# Patient Record
Sex: FEMALE | Race: ASIAN | NOT HISPANIC OR LATINO
[De-identification: names, ages, dates, MRNs, and addresses within clinical notes are randomized per-mention and may not be internally consistent; named-entity substitution may affect disease eponyms.]

---

## 2021-04-06 ENCOUNTER — TRANSCRIPTION ENCOUNTER (OUTPATIENT)
Age: 81
End: 2021-04-06

## 2021-07-09 ENCOUNTER — OUTPATIENT (OUTPATIENT)
Dept: OUTPATIENT SERVICES | Facility: HOSPITAL | Age: 81
LOS: 1 days | End: 2021-07-09

## 2021-07-09 DIAGNOSIS — R91.1 SOLITARY PULMONARY NODULE: ICD-10-CM

## 2021-07-13 PROBLEM — Z00.00 ENCOUNTER FOR PREVENTIVE HEALTH EXAMINATION: Status: ACTIVE | Noted: 2021-07-13

## 2021-07-21 ENCOUNTER — APPOINTMENT (OUTPATIENT)
Dept: THORACIC SURGERY | Facility: CLINIC | Age: 81
End: 2021-07-21
Payer: MEDICARE

## 2021-07-21 VITALS — TEMPERATURE: 97.1 F

## 2021-07-21 DIAGNOSIS — Z86.59 PERSONAL HISTORY OF OTHER MENTAL AND BEHAVIORAL DISORDERS: ICD-10-CM

## 2021-07-21 DIAGNOSIS — R91.1 SOLITARY PULMONARY NODULE: ICD-10-CM

## 2021-07-21 DIAGNOSIS — Z78.9 OTHER SPECIFIED HEALTH STATUS: ICD-10-CM

## 2021-07-21 DIAGNOSIS — Z80.9 FAMILY HISTORY OF MALIGNANT NEOPLASM, UNSPECIFIED: ICD-10-CM

## 2021-07-21 PROCEDURE — 99205 OFFICE O/P NEW HI 60 MIN: CPT

## 2021-07-22 PROBLEM — Z86.59 HISTORY OF DEPRESSION: Status: RESOLVED | Noted: 2021-07-21 | Resolved: 2021-07-22

## 2021-07-22 PROBLEM — Z78.9 NON-SMOKER: Status: ACTIVE | Noted: 2021-07-21

## 2021-07-22 PROBLEM — Z80.9 FAMILY HISTORY OF MALIGNANT NEOPLASM: Status: ACTIVE | Noted: 2021-07-21

## 2021-07-22 RX ORDER — MELOXICAM 15 MG/1
15 TABLET ORAL
Refills: 0 | Status: ACTIVE | COMMUNITY

## 2021-07-22 RX ORDER — DOCUSATE SODIUM 100 MG/1
100 CAPSULE, LIQUID FILLED ORAL
Refills: 0 | Status: ACTIVE | COMMUNITY

## 2021-07-22 RX ORDER — RANITIDINE 150 MG/1
150 TABLET ORAL
Refills: 0 | Status: ACTIVE | COMMUNITY

## 2021-07-22 RX ORDER — PREGABALIN 300 MG/1
CAPSULE ORAL
Refills: 0 | Status: ACTIVE | COMMUNITY

## 2021-07-22 RX ORDER — VALSARTAN 160 MG/1
160 TABLET, COATED ORAL
Refills: 0 | Status: ACTIVE | COMMUNITY

## 2021-07-22 RX ORDER — ERGOCALCIFEROL (VITAMIN D2) 1250 MCG
50000 CAPSULE ORAL
Refills: 0 | Status: ACTIVE | COMMUNITY

## 2021-07-22 RX ORDER — VORTIOXETINE 20 MG/1
20 TABLET, FILM COATED ORAL
Refills: 0 | Status: ACTIVE | COMMUNITY

## 2021-07-22 NOTE — PHYSICAL EXAM
[General Appearance - Alert] : alert [General Appearance - In No Acute Distress] : in no acute distress [General Appearance - Well-Appearing] : healthy appearing [Sclera] : the sclera and conjunctiva were normal [Extraocular Movements] : extraocular movements were intact [Outer Ear] : the ears and nose were normal in appearance [Neck Appearance] : the appearance of the neck was normal [Neck Cervical Mass (___cm)] : no neck mass was observed [Jugular Venous Distention Increased] : there was no jugular-venous distention [Exaggerated Use Of Accessory Muscles For Inspiration] : no accessory muscle use [Auscultation Breath Sounds / Voice Sounds] : lungs were clear to auscultation bilaterally [Heart Sounds] : normal S1 and S2 [Examination Of The Chest] : the chest was normal in appearance [Abdomen Soft] : soft [Abdomen Tenderness] : non-tender [Cervical Lymph Nodes Enlarged Posterior Bilaterally] : posterior cervical [Cervical Lymph Nodes Enlarged Anterior Bilaterally] : anterior cervical [Supraclavicular Lymph Nodes Enlarged Bilaterally] : supraclavicular [No CVA Tenderness] : no ~M costovertebral angle tenderness [Abnormal Walk] : normal gait [Skin Color & Pigmentation] : normal skin color and pigmentation [] : no rash [No Focal Deficits] : no focal deficits [Oriented To Time, Place, And Person] : oriented to person, place, and time

## 2021-07-22 NOTE — ASSESSMENT
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 Alexander for evaluation of right lung nodule.\par \par Scans done at [facility]\par 11/19/2020 CT chest non-contrast\par Interval development of groups of small nodular densities in the right lower lobe measuring u p to 4 mm in diameter.  There are nonspecific in appearance and may be inflammatory in nature.\par \par 5/5/21 CT chest with contrast\par New subpleural lobular opacity in anterior lateral right lower lobe, cannot rule out neoplasm.  Further PET?CT assessment and or close CT monitoring as clinically indicated.\par \par 6/17/21 PET/CT\par FDG avid nodule  at the anterior right lung base.  Findings may be inflammatory versus malignant in nature.  Subcentimeter nodule along the minor fissure which appears stable and is not FDG avid.\par \par  has reviewed the case with IR at both [facility] and [facility], and due to location IR is unable to biopsy.   A CT surgery eval is recommended for tissue diagnosis.  \par \par Patient presents with her daughter in law.  We communicated using [employer] ID#[id number].\par Patient denies SOB, dyspnea, hemoptysis and hoarseness. She is asymptomatic and feeling well. \par \par I recommend Right VATS wedge biopsy with possible lobectomy.  \par She will need cardiac clearance and PFT's.  \par I referred her to Dr. Trotter (want a new cardiologist because she says her cardiologist location is too far)\par Tasked Dr. Staples to arrange PFT's\par \par Written by  Sowmya Whalen PA-C acting as a scribe for Aquiles Javier MD. The documentation recorded by the scribe accurately reflects the service I personally performed and the decisions made by me, AQUILES JAVIER MD.\par \par

## 2021-07-22 NOTE — HISTORY OF PRESENT ILLNESS
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 Alexander for evaluation of right lung nodule.\par \par Scans done at [facility]\par 11/19/2020 CT chest non-contrast\par Interval development of groups of small nodular densities in the right lower lobe measuring u p to 4 mm in diameter.  There are nonspecific in appearance and may be inflammatory in nature.\par \par 5/5/21 CT chest with contrast\par New subpleural lobular opacity in anterior lateral right lower lobe, cannot rule out neoplasm.  Further PET?CT assessment and or close CT monitoring as clinically indicated.\par \par 6/17/21 PET/CT\par FDG avid nodule  at the anterior right lung base.  Findings may be inflammatory versus malignant in nature.  Subcentimeter nodule along the minor fissure which appears stable and is not FDG avid.\par \par  has reviewed the case with IR at both [facility] and [facility], and due to location IR is unable to biopsy.   A CT surery eval is recommended for tissue diagnosis.  \par \par Patient presents with her daughter in law.  We communicated using [employer] ID#[id number].\par Patient denies SOB, dyspnea, hemoptysis and hoarseness. She is asymptomatic and feeling well. \par

## 2021-07-27 ENCOUNTER — APPOINTMENT (OUTPATIENT)
Dept: PULMONOLOGY | Facility: CLINIC | Age: 81
End: 2021-07-27
Payer: MEDICARE

## 2021-07-27 PROCEDURE — 94010 BREATHING CAPACITY TEST: CPT

## 2021-07-27 PROCEDURE — 94726 PLETHYSMOGRAPHY LUNG VOLUMES: CPT

## 2021-07-27 PROCEDURE — 94729 DIFFUSING CAPACITY: CPT

## 2021-08-03 DIAGNOSIS — I10 ESSENTIAL (PRIMARY) HYPERTENSION: ICD-10-CM

## 2021-08-03 DIAGNOSIS — E78.00 PURE HYPERCHOLESTEROLEMIA, UNSPECIFIED: ICD-10-CM

## 2021-08-03 DIAGNOSIS — E11.9 TYPE 2 DIABETES MELLITUS W/OUT COMPLICATIONS: ICD-10-CM

## 2021-08-04 ENCOUNTER — NON-APPOINTMENT (OUTPATIENT)
Age: 81
End: 2021-08-04

## 2021-08-04 ENCOUNTER — APPOINTMENT (OUTPATIENT)
Dept: CARDIOLOGY | Facility: CLINIC | Age: 81
End: 2021-08-04
Payer: MEDICARE

## 2021-08-04 VITALS
HEART RATE: 66 BPM | BODY MASS INDEX: 30.64 KG/M2 | SYSTOLIC BLOOD PRESSURE: 148 MMHG | DIASTOLIC BLOOD PRESSURE: 74 MMHG | WEIGHT: 146 LBS | OXYGEN SATURATION: 93 % | HEIGHT: 58 IN

## 2021-08-04 DIAGNOSIS — E03.9 HYPOTHYROIDISM, UNSPECIFIED: ICD-10-CM

## 2021-08-04 PROCEDURE — 99204 OFFICE O/P NEW MOD 45 MIN: CPT

## 2021-08-04 PROCEDURE — 93000 ELECTROCARDIOGRAM COMPLETE: CPT

## 2021-08-04 RX ORDER — ESCITALOPRAM OXALATE 20 MG/1
20 TABLET ORAL DAILY
Refills: 0 | Status: ACTIVE | COMMUNITY

## 2021-08-04 RX ORDER — FOLIC ACID 1 MG/1
1 TABLET ORAL
Qty: 90 | Refills: 3 | Status: ACTIVE | COMMUNITY

## 2021-08-04 RX ORDER — VALSARTAN 320 MG/1
320 TABLET, COATED ORAL DAILY
Qty: 90 | Refills: 1 | Status: ACTIVE | COMMUNITY

## 2021-08-04 RX ORDER — CHLORHEXIDINE GLUCONATE 4 %
1000 LIQUID (ML) TOPICAL DAILY
Refills: 0 | Status: ACTIVE | COMMUNITY

## 2021-08-04 RX ORDER — ALENDRONATE SODIUM 70 MG/1
70 TABLET ORAL
Refills: 0 | Status: ACTIVE | COMMUNITY

## 2021-08-04 RX ORDER — BUSPIRONE HYDROCHLORIDE 5 MG/1
5 TABLET ORAL
Refills: 0 | Status: ACTIVE | COMMUNITY

## 2021-08-04 RX ORDER — PANTOPRAZOLE 40 MG/1
TABLET, DELAYED RELEASE ORAL
Refills: 0 | Status: DISCONTINUED | COMMUNITY
End: 2021-08-04

## 2021-08-04 RX ORDER — PANTOPRAZOLE 40 MG/1
40 TABLET, DELAYED RELEASE ORAL DAILY
Qty: 90 | Refills: 3 | Status: ACTIVE | COMMUNITY

## 2021-08-04 RX ORDER — LEVOTHYROXINE SODIUM 125 UG/1
125 TABLET ORAL DAILY
Refills: 0 | Status: ACTIVE | COMMUNITY

## 2021-08-04 RX ORDER — METFORMIN HYDROCHLORIDE 625 MG/1
TABLET ORAL TWICE DAILY
Refills: 0 | Status: ACTIVE | COMMUNITY

## 2021-08-04 RX ORDER — MIRABEGRON 25 MG/1
25 TABLET, FILM COATED, EXTENDED RELEASE ORAL DAILY
Refills: 0 | Status: ACTIVE | COMMUNITY

## 2021-08-04 RX ORDER — MULTIVITAMIN
TABLET ORAL DAILY
Refills: 0 | Status: ACTIVE | COMMUNITY

## 2021-08-04 RX ORDER — IRON/IRON ASP GLY/FA/MV-MIN 38 125-25-1MG
TABLET ORAL DAILY
Refills: 0 | Status: ACTIVE | COMMUNITY

## 2021-08-04 NOTE — PHYSICAL EXAM
[Normal Conjunctiva] : normal conjunctiva [Normal Venous Pressure] : normal venous pressure [Normal S1, S2] : normal S1, S2 [Clear Lung Fields] : clear lung fields [Soft] : abdomen soft [de-identified] : Obese

## 2021-08-04 NOTE — HISTORY OF PRESENT ILLNESS
[FreeTextEntry1] : 81 yr F with HTN, Obesity\par Chronic dyspnea on exertiona, TTE in Feb 2021 was normal. \par Nuclear stress test in Feb 2021 no ischemia, EF 70%\par Patient walks with a walker, has very limited exercise tolerance because of her overall deconditioning, she is undergoing lung biopsy and is here for preop clearance.

## 2021-08-04 NOTE — DISCUSSION/SUMMARY
[FreeTextEntry1] : Hypertension: We will reassess the blood pressure on the next visit, at this time recommended medication compliance and salt restriction.\par \par Dyspnea on exertion: This is chronic, likely related to obesity and overall deconditioning.  She had stress test echocardiogram in the past that did not show any significant abnormalities.\par \par Preop prior to lung biopsy.  At this time patient does not need any further cardiovascular testing and is optimized for the lung procedure, recommend periprocedural monitoring as per standard surgical guidelines.

## 2021-08-04 NOTE — REVIEW OF SYSTEMS
[Fever] : no fever [Feeling Fatigued] : feeling fatigued [Blurry Vision] : no blurred vision [Earache] : no earache [Sore Throat] : no sore throat [SOB] : shortness of breath [Leg Claudication] : no intermittent leg claudication [Cough] : no cough [Abdominal Pain] : no abdominal pain

## 2021-08-29 ENCOUNTER — APPOINTMENT (OUTPATIENT)
Dept: DISASTER EMERGENCY | Facility: CLINIC | Age: 81
End: 2021-08-29

## 2021-08-30 LAB — SARS-COV-2 N GENE NPH QL NAA+PROBE: NOT DETECTED

## 2021-09-01 ENCOUNTER — APPOINTMENT (OUTPATIENT)
Dept: NUCLEAR MEDICINE | Facility: HOSPITAL | Age: 81
End: 2021-09-01
Payer: MEDICARE

## 2021-09-01 ENCOUNTER — OUTPATIENT (OUTPATIENT)
Dept: OUTPATIENT SERVICES | Facility: HOSPITAL | Age: 81
LOS: 1 days | End: 2021-09-01
Payer: COMMERCIAL

## 2021-09-01 DIAGNOSIS — R91.1 SOLITARY PULMONARY NODULE: ICD-10-CM

## 2021-09-01 PROCEDURE — 78597 LUNG PERFUSION DIFFERENTIAL: CPT

## 2021-09-01 PROCEDURE — 78597 LUNG PERFUSION DIFFERENTIAL: CPT | Mod: 26

## 2021-09-10 ENCOUNTER — OUTPATIENT (OUTPATIENT)
Dept: OUTPATIENT SERVICES | Facility: HOSPITAL | Age: 81
LOS: 1 days | End: 2021-09-10

## 2021-09-10 ENCOUNTER — NON-APPOINTMENT (OUTPATIENT)
Age: 81
End: 2021-09-10

## 2021-09-10 VITALS
TEMPERATURE: 98 F | HEART RATE: 77 BPM | WEIGHT: 169.98 LBS | OXYGEN SATURATION: 96 % | HEIGHT: 55 IN | DIASTOLIC BLOOD PRESSURE: 80 MMHG | SYSTOLIC BLOOD PRESSURE: 130 MMHG | RESPIRATION RATE: 14 BRPM

## 2021-09-10 DIAGNOSIS — Z90.49 ACQUIRED ABSENCE OF OTHER SPECIFIED PARTS OF DIGESTIVE TRACT: Chronic | ICD-10-CM

## 2021-09-10 DIAGNOSIS — E11.9 TYPE 2 DIABETES MELLITUS WITHOUT COMPLICATIONS: ICD-10-CM

## 2021-09-10 DIAGNOSIS — R91.1 SOLITARY PULMONARY NODULE: ICD-10-CM

## 2021-09-10 DIAGNOSIS — E78.5 HYPERLIPIDEMIA, UNSPECIFIED: ICD-10-CM

## 2021-09-10 DIAGNOSIS — Z01.818 ENCOUNTER FOR OTHER PREPROCEDURAL EXAMINATION: ICD-10-CM

## 2021-09-10 DIAGNOSIS — Z96.659 PRESENCE OF UNSPECIFIED ARTIFICIAL KNEE JOINT: Chronic | ICD-10-CM

## 2021-09-10 DIAGNOSIS — F32.9 MAJOR DEPRESSIVE DISORDER, SINGLE EPISODE, UNSPECIFIED: ICD-10-CM

## 2021-09-10 DIAGNOSIS — E03.9 HYPOTHYROIDISM, UNSPECIFIED: ICD-10-CM

## 2021-09-10 DIAGNOSIS — Z98.890 OTHER SPECIFIED POSTPROCEDURAL STATES: Chronic | ICD-10-CM

## 2021-09-10 DIAGNOSIS — I10 ESSENTIAL (PRIMARY) HYPERTENSION: ICD-10-CM

## 2021-09-10 LAB
A1C WITH ESTIMATED AVERAGE GLUCOSE RESULT: 6.3 % — HIGH (ref 4–5.6)
ALBUMIN SERPL ELPH-MCNC: 4.4 G/DL — SIGNIFICANT CHANGE UP (ref 3.3–5)
ALP SERPL-CCNC: 61 U/L — SIGNIFICANT CHANGE UP (ref 40–120)
ALT FLD-CCNC: 18 U/L — SIGNIFICANT CHANGE UP (ref 4–33)
ANION GAP SERPL CALC-SCNC: 13 MMOL/L — SIGNIFICANT CHANGE UP (ref 7–14)
AST SERPL-CCNC: 19 U/L — SIGNIFICANT CHANGE UP (ref 4–32)
BILIRUB SERPL-MCNC: 0.3 MG/DL — SIGNIFICANT CHANGE UP (ref 0.2–1.2)
BLD GP AB SCN SERPL QL: NEGATIVE — SIGNIFICANT CHANGE UP
BUN SERPL-MCNC: 15 MG/DL — SIGNIFICANT CHANGE UP (ref 7–23)
CALCIUM SERPL-MCNC: 8.8 MG/DL — SIGNIFICANT CHANGE UP (ref 8.4–10.5)
CHLORIDE SERPL-SCNC: 99 MMOL/L — SIGNIFICANT CHANGE UP (ref 98–107)
CO2 SERPL-SCNC: 29 MMOL/L — SIGNIFICANT CHANGE UP (ref 22–31)
CREAT SERPL-MCNC: 0.88 MG/DL — SIGNIFICANT CHANGE UP (ref 0.5–1.3)
ESTIMATED AVERAGE GLUCOSE: 134 — SIGNIFICANT CHANGE UP
GLUCOSE SERPL-MCNC: 102 MG/DL — HIGH (ref 70–99)
HCT VFR BLD CALC: 36.9 % — SIGNIFICANT CHANGE UP (ref 34.5–45)
HGB BLD-MCNC: 12.3 G/DL — SIGNIFICANT CHANGE UP (ref 11.5–15.5)
MCHC RBC-ENTMCNC: 29.3 PG — SIGNIFICANT CHANGE UP (ref 27–34)
MCHC RBC-ENTMCNC: 33.3 GM/DL — SIGNIFICANT CHANGE UP (ref 32–36)
MCV RBC AUTO: 87.9 FL — SIGNIFICANT CHANGE UP (ref 80–100)
NRBC # BLD: 0 /100 WBCS — SIGNIFICANT CHANGE UP
NRBC # FLD: 0 K/UL — SIGNIFICANT CHANGE UP
PLATELET # BLD AUTO: 134 K/UL — LOW (ref 150–400)
POTASSIUM SERPL-MCNC: 4 MMOL/L — SIGNIFICANT CHANGE UP (ref 3.5–5.3)
POTASSIUM SERPL-SCNC: 4 MMOL/L — SIGNIFICANT CHANGE UP (ref 3.5–5.3)
PROT SERPL-MCNC: 6.7 G/DL — SIGNIFICANT CHANGE UP (ref 6–8.3)
RBC # BLD: 4.2 M/UL — SIGNIFICANT CHANGE UP (ref 3.8–5.2)
RBC # FLD: 14.3 % — SIGNIFICANT CHANGE UP (ref 10.3–14.5)
RH IG SCN BLD-IMP: POSITIVE — SIGNIFICANT CHANGE UP
SODIUM SERPL-SCNC: 141 MMOL/L — SIGNIFICANT CHANGE UP (ref 135–145)
WBC # BLD: 8.37 K/UL — SIGNIFICANT CHANGE UP (ref 3.8–10.5)
WBC # FLD AUTO: 8.37 K/UL — SIGNIFICANT CHANGE UP (ref 3.8–10.5)

## 2021-09-10 RX ORDER — SODIUM CHLORIDE 9 MG/ML
1000 INJECTION INTRAMUSCULAR; INTRAVENOUS; SUBCUTANEOUS
Refills: 0 | Status: DISCONTINUED | OUTPATIENT
Start: 2021-09-14 | End: 2021-09-14

## 2021-09-10 NOTE — H&P PST ADULT - NSICDXPASTSURGICALHX_GEN_ALL_CORE_FT
PAST SURGICAL HISTORY:  H/O arthroscopy left shoulder    H/O total knee replacement b/l knee    History of appendectomy     S/P cholecystectomy

## 2021-09-10 NOTE — H&P PST ADULT - PROBLEM SELECTOR PLAN 4
Patient instructed to hold metformin the night before and on the morning of the surgery, pt verbalized understanding.

## 2021-09-10 NOTE — H&P PST ADULT - ATTENDING COMMENTS
plan for right robotic vats, right lower lobe wedge, son used a  (confirmed w/ translation line)   discussed procedure including risks not limited to bleeding, infection, scarring, injury to surrounding structures, prolonged air leak, blood clots .

## 2021-09-10 NOTE — H&P PST ADULT - PROBLEM SELECTOR PLAN 1
Pt scheduled for robotic assisted right VATS, right lower lobe wedge resection, mediastinal lymph node dissection on 09/14/21  Preop instructions provided. Pt verbalized understanding.   pt to take pantoprazole for GI Prophylaxis   written and verbal instructions with teach back on chlorhexidine shampoo provided,  pt verbalized understanding with returned demonstration   s/p cardiac eval, c/o dyspnea on exertion, copy in chart  pt confirmed has appt for COVID test scheduled 72 hrs preop

## 2021-09-10 NOTE — H&P PST ADULT - NSICDXPASTMEDICALHX_GEN_ALL_CORE_FT
PAST MEDICAL HISTORY:  Depression     DM (diabetes mellitus)     HLD (hyperlipidemia)     HTN (hypertension)     Hypothyroidism     Solitary pulmonary nodule

## 2021-09-10 NOTE — H&P PST ADULT - HISTORY OF PRESENT ILLNESS
81 y.o. female with h/o HTN, HLD, DM, hypothyroidism, presents to PST with preop diagnosis of solitary pulmonary nodule, c/o dyspnea on exertion, denies cough, hemoptysis, scheduled for robotic assisted right video assisted thoracoscopy, right lower lobe wedge resection, mediastinal lymph node dissection

## 2021-09-11 ENCOUNTER — APPOINTMENT (OUTPATIENT)
Dept: DISASTER EMERGENCY | Facility: CLINIC | Age: 81
End: 2021-09-11

## 2021-09-12 LAB — SARS-COV-2 N GENE NPH QL NAA+PROBE: NOT DETECTED

## 2021-09-13 RX ORDER — HEPARIN SODIUM 5000 [USP'U]/ML
5000 INJECTION INTRAVENOUS; SUBCUTANEOUS ONCE
Refills: 0 | Status: COMPLETED | OUTPATIENT
Start: 2021-09-14 | End: 2021-09-14

## 2021-09-13 NOTE — ASU PATIENT PROFILE, ADULT - LANGUAGE ASSISTANCE NEEDED
No-Patient/Caregiver offered and refused free interpretation services. Interpret [id number]/ patient requests son/No-Patient/Caregiver offered and refused free interpretation services.

## 2021-09-14 ENCOUNTER — RESULT REVIEW (OUTPATIENT)
Age: 81
End: 2021-09-14

## 2021-09-14 ENCOUNTER — INPATIENT (INPATIENT)
Facility: HOSPITAL | Age: 81
LOS: 5 days | Discharge: HOME HEALTH | End: 2021-09-20
Attending: STUDENT IN AN ORGANIZED HEALTH CARE EDUCATION/TRAINING PROGRAM | Admitting: STUDENT IN AN ORGANIZED HEALTH CARE EDUCATION/TRAINING PROGRAM
Payer: MEDICARE

## 2021-09-14 ENCOUNTER — APPOINTMENT (OUTPATIENT)
Dept: THORACIC SURGERY | Facility: HOSPITAL | Age: 81
End: 2021-09-14

## 2021-09-14 VITALS
HEART RATE: 73 BPM | WEIGHT: 169.98 LBS | OXYGEN SATURATION: 95 % | DIASTOLIC BLOOD PRESSURE: 72 MMHG | SYSTOLIC BLOOD PRESSURE: 148 MMHG | TEMPERATURE: 98 F | HEIGHT: 55 IN | RESPIRATION RATE: 14 BRPM

## 2021-09-14 DIAGNOSIS — R91.1 SOLITARY PULMONARY NODULE: ICD-10-CM

## 2021-09-14 DIAGNOSIS — Z98.890 OTHER SPECIFIED POSTPROCEDURAL STATES: Chronic | ICD-10-CM

## 2021-09-14 DIAGNOSIS — Z90.49 ACQUIRED ABSENCE OF OTHER SPECIFIED PARTS OF DIGESTIVE TRACT: Chronic | ICD-10-CM

## 2021-09-14 DIAGNOSIS — Z96.659 PRESENCE OF UNSPECIFIED ARTIFICIAL KNEE JOINT: Chronic | ICD-10-CM

## 2021-09-14 LAB — RH IG SCN BLD-IMP: POSITIVE — SIGNIFICANT CHANGE UP

## 2021-09-14 PROCEDURE — 71045 X-RAY EXAM CHEST 1 VIEW: CPT | Mod: 26

## 2021-09-14 PROCEDURE — 88307 TISSUE EXAM BY PATHOLOGIST: CPT | Mod: 26

## 2021-09-14 PROCEDURE — S2900 ROBOTIC SURGICAL SYSTEM: CPT | Mod: NC

## 2021-09-14 PROCEDURE — 32667 THORACOSCOPY W/W RESECT ADDL: CPT

## 2021-09-14 PROCEDURE — 88312 SPECIAL STAINS GROUP 1: CPT | Mod: 26

## 2021-09-14 PROCEDURE — 32666 THORACOSCOPY W/WEDGE RESECT: CPT

## 2021-09-14 PROCEDURE — 32667 THORACOSCOPY W/W RESECT ADDL: CPT | Mod: AS

## 2021-09-14 PROCEDURE — 32666 THORACOSCOPY W/WEDGE RESECT: CPT | Mod: AS

## 2021-09-14 PROCEDURE — 99233 SBSQ HOSP IP/OBS HIGH 50: CPT

## 2021-09-14 RX ORDER — MIRABEGRON 50 MG/1
1 TABLET, EXTENDED RELEASE ORAL
Qty: 0 | Refills: 0 | DISCHARGE

## 2021-09-14 RX ORDER — SODIUM CHLORIDE 9 MG/ML
1000 INJECTION, SOLUTION INTRAVENOUS
Refills: 0 | Status: DISCONTINUED | OUTPATIENT
Start: 2021-09-14 | End: 2021-09-14

## 2021-09-14 RX ORDER — PANTOPRAZOLE SODIUM 20 MG/1
40 TABLET, DELAYED RELEASE ORAL
Refills: 0 | Status: DISCONTINUED | OUTPATIENT
Start: 2021-09-14 | End: 2021-09-20

## 2021-09-14 RX ORDER — SENNA PLUS 8.6 MG/1
2 TABLET ORAL AT BEDTIME
Refills: 0 | Status: DISCONTINUED | OUTPATIENT
Start: 2021-09-14 | End: 2021-09-20

## 2021-09-14 RX ORDER — DEXTROSE 50 % IN WATER 50 %
12.5 SYRINGE (ML) INTRAVENOUS ONCE
Refills: 0 | Status: DISCONTINUED | OUTPATIENT
Start: 2021-09-14 | End: 2021-09-14

## 2021-09-14 RX ORDER — NALOXONE HYDROCHLORIDE 4 MG/.1ML
0.1 SPRAY NASAL
Refills: 0 | Status: DISCONTINUED | OUTPATIENT
Start: 2021-09-14 | End: 2021-09-20

## 2021-09-14 RX ORDER — ONDANSETRON 8 MG/1
4 TABLET, FILM COATED ORAL EVERY 6 HOURS
Refills: 0 | Status: DISCONTINUED | OUTPATIENT
Start: 2021-09-14 | End: 2021-09-20

## 2021-09-14 RX ORDER — LEVOTHYROXINE SODIUM 125 MCG
150 TABLET ORAL DAILY
Refills: 0 | Status: DISCONTINUED | OUTPATIENT
Start: 2021-09-14 | End: 2021-09-20

## 2021-09-14 RX ORDER — PREGABALIN 225 MG/1
1 CAPSULE ORAL
Qty: 0 | Refills: 0 | DISCHARGE

## 2021-09-14 RX ORDER — GLUCAGON INJECTION, SOLUTION 0.5 MG/.1ML
1 INJECTION, SOLUTION SUBCUTANEOUS ONCE
Refills: 0 | Status: DISCONTINUED | OUTPATIENT
Start: 2021-09-14 | End: 2021-09-14

## 2021-09-14 RX ORDER — ACETAMINOPHEN 500 MG
975 TABLET ORAL ONCE
Refills: 0 | Status: COMPLETED | OUTPATIENT
Start: 2021-09-14 | End: 2021-09-14

## 2021-09-14 RX ORDER — INSULIN LISPRO 100/ML
VIAL (ML) SUBCUTANEOUS
Refills: 0 | Status: DISCONTINUED | OUTPATIENT
Start: 2021-09-14 | End: 2021-09-20

## 2021-09-14 RX ORDER — DEXTROSE 50 % IN WATER 50 %
15 SYRINGE (ML) INTRAVENOUS ONCE
Refills: 0 | Status: DISCONTINUED | OUTPATIENT
Start: 2021-09-14 | End: 2021-09-14

## 2021-09-14 RX ORDER — ACETAMINOPHEN 500 MG
1000 TABLET ORAL ONCE
Refills: 0 | Status: DISCONTINUED | OUTPATIENT
Start: 2021-09-14 | End: 2021-09-15

## 2021-09-14 RX ORDER — DEXTROSE 50 % IN WATER 50 %
25 SYRINGE (ML) INTRAVENOUS ONCE
Refills: 0 | Status: DISCONTINUED | OUTPATIENT
Start: 2021-09-14 | End: 2021-09-14

## 2021-09-14 RX ORDER — RANITIDINE HYDROCHLORIDE 150 MG/1
1 TABLET, FILM COATED ORAL
Qty: 0 | Refills: 0 | DISCHARGE

## 2021-09-14 RX ORDER — METOPROLOL TARTRATE 50 MG
1 TABLET ORAL
Qty: 0 | Refills: 0 | DISCHARGE

## 2021-09-14 RX ORDER — MIRABEGRON 50 MG/1
25 TABLET, EXTENDED RELEASE ORAL DAILY
Refills: 0 | Status: DISCONTINUED | OUTPATIENT
Start: 2021-09-14 | End: 2021-09-16

## 2021-09-14 RX ORDER — HYDROMORPHONE HYDROCHLORIDE 2 MG/ML
30 INJECTION INTRAMUSCULAR; INTRAVENOUS; SUBCUTANEOUS
Refills: 0 | Status: DISCONTINUED | OUTPATIENT
Start: 2021-09-14 | End: 2021-09-15

## 2021-09-14 RX ORDER — HEPARIN SODIUM 5000 [USP'U]/ML
5000 INJECTION INTRAVENOUS; SUBCUTANEOUS EVERY 8 HOURS
Refills: 0 | Status: DISCONTINUED | OUTPATIENT
Start: 2021-09-14 | End: 2021-09-20

## 2021-09-14 RX ORDER — ATORVASTATIN CALCIUM 80 MG/1
1 TABLET, FILM COATED ORAL
Qty: 0 | Refills: 0 | DISCHARGE

## 2021-09-14 RX ORDER — METFORMIN HYDROCHLORIDE 850 MG/1
1 TABLET ORAL
Qty: 0 | Refills: 0 | DISCHARGE

## 2021-09-14 RX ORDER — HYDROMORPHONE HYDROCHLORIDE 2 MG/ML
0.5 INJECTION INTRAMUSCULAR; INTRAVENOUS; SUBCUTANEOUS
Refills: 0 | Status: DISCONTINUED | OUTPATIENT
Start: 2021-09-14 | End: 2021-09-15

## 2021-09-14 RX ORDER — DEXTROSE 50 % IN WATER 50 %
25 SYRINGE (ML) INTRAVENOUS ONCE
Refills: 0 | Status: DISCONTINUED | OUTPATIENT
Start: 2021-09-14 | End: 2021-09-20

## 2021-09-14 RX ORDER — PANTOPRAZOLE SODIUM 20 MG/1
1 TABLET, DELAYED RELEASE ORAL
Qty: 0 | Refills: 0 | DISCHARGE

## 2021-09-14 RX ORDER — ATORVASTATIN CALCIUM 80 MG/1
10 TABLET, FILM COATED ORAL AT BEDTIME
Refills: 0 | Status: DISCONTINUED | OUTPATIENT
Start: 2021-09-14 | End: 2021-09-20

## 2021-09-14 RX ORDER — LEVOTHYROXINE SODIUM 125 MCG
1 TABLET ORAL
Qty: 0 | Refills: 0 | DISCHARGE

## 2021-09-14 RX ORDER — SODIUM CHLORIDE 9 MG/ML
1000 INJECTION, SOLUTION INTRAVENOUS
Refills: 0 | Status: DISCONTINUED | OUTPATIENT
Start: 2021-09-14 | End: 2021-09-16

## 2021-09-14 RX ORDER — ACETAMINOPHEN 500 MG
1000 TABLET ORAL ONCE
Refills: 0 | Status: COMPLETED | OUTPATIENT
Start: 2021-09-14 | End: 2021-09-14

## 2021-09-14 RX ORDER — BENZOYL PEROXIDE MICRONIZED 5.8 %
1 TOWELETTE (EA) TOPICAL
Qty: 0 | Refills: 0 | DISCHARGE

## 2021-09-14 RX ORDER — MELOXICAM 15 MG/1
1 TABLET ORAL
Qty: 0 | Refills: 0 | DISCHARGE

## 2021-09-14 RX ADMIN — Medication 1000 MILLIGRAM(S): at 17:20

## 2021-09-14 RX ADMIN — HEPARIN SODIUM 5000 UNIT(S): 5000 INJECTION INTRAVENOUS; SUBCUTANEOUS at 17:02

## 2021-09-14 RX ADMIN — Medication 400 MILLIGRAM(S): at 17:05

## 2021-09-14 RX ADMIN — MIRABEGRON 25 MILLIGRAM(S): 50 TABLET, EXTENDED RELEASE ORAL at 17:02

## 2021-09-14 RX ADMIN — HYDROMORPHONE HYDROCHLORIDE 30 MILLILITER(S): 2 INJECTION INTRAMUSCULAR; INTRAVENOUS; SUBCUTANEOUS at 13:05

## 2021-09-14 RX ADMIN — SODIUM CHLORIDE 30 MILLILITER(S): 9 INJECTION, SOLUTION INTRAVENOUS at 12:50

## 2021-09-14 RX ADMIN — ATORVASTATIN CALCIUM 10 MILLIGRAM(S): 80 TABLET, FILM COATED ORAL at 23:14

## 2021-09-14 RX ADMIN — HEPARIN SODIUM 5000 UNIT(S): 5000 INJECTION INTRAVENOUS; SUBCUTANEOUS at 22:20

## 2021-09-14 RX ADMIN — SENNA PLUS 2 TABLET(S): 8.6 TABLET ORAL at 22:20

## 2021-09-14 NOTE — PROGRESS NOTE ADULT - SUBJECTIVE AND OBJECTIVE BOX
CHIEF COMPLAINT: FOLLOW UP IN ICU FOR POSTOPERATIVE CARE OF PATIENT WHO IS S/P multiple wedge resections      PROCEDURES:  Robotic RVATS, RUL wedgex2, RLL wedgex1, Pneumonolysis  14-Sep-2021       ISSUES:   Lung nodule  Post op pain  Chest tube in place  HTN  HLD  DM2  Hypothyroidism  Depression    INTERVAL EVENTS:   OR today. Extubated in OR. Transferred to CTICU.      HISTORY:   Patient reports moderate pain at chest wall incision sites which is worse with coughing and deep breathing without associated fever or dyspnea. Pain is improved with use of pain meds.     PHYSICAL EXAM:   Gen: Comfortable, No acute distress  Eyes: Sclera white, Conjunctiva normal, Eyelids normal, Pupils symmetrical   ENT: Mucous membranes moist,  ,  ,    Neck: Trachea midline,  ,  ,  ,  ,    CV: Rate regular, Rhythm regular,  ,  ,    Resp: Breath sounds clear, No accessory muscles use, R chest tube in place,  ,    Abd: Soft, Non-distended, Non-tender, Bowel sounds normal,  ,  ,    Skin: Warm, No peripheral edema of lower extremities,  ,    : No murphy  Neuro: Moving all 4 extremities,    Psych: A&Ox3      ASSESSMENT AND PLAN:     NEURO:  Post-operative Pain - Stable. Pain control with PCA and Tylenol IV PRN.        RESPIRATORY:  Hypoxia - Wean nasal cannula for goal O2sat above 92. Obtain CXR. Incentive spirometry. Chest PT and frequent suctioning. Continue bronchodilators. OOB to chair & ambulate w/ assistance. Continuous pulse oximetry for support & to prevent decompensation.     Depression - stable. continue psych meds.    Chest tube – Pleurevac regulated suctioning. Monitor chest tube output.    CARDIOVASCULAR:  Hemodynamically stable - Not on pressors. Continue hemodynamic monitoring.  Telemetry (medical test) - Reviewed by me today independently. Normal sinus rhythm.  HTN - stable. Hold home antihypertensives for now.         RENAL:  Stable - Monitor IOs and electrolytes. Keep K above 4.0 and Mg above 2.0.         GASTROINTESTINAL:  GI prophylaxis not indicated  Zofran and Reglan IV PRN for nausea  Regular consistency diet        GERD - stable. Continue famotidine    HEMATOLOGIC:  No signs of active bleeding. Monitor Hgb in CBC in AM  DVT prophylaxis with heparin subQ and SCDs.           INFECTIOUS DISEASE:  All surgical sites appear clean. Will monitor for fever and leukocytosis.       ENDOCRINE:  DM2 – Stable. Monitor glucose fingersticks for goal 120-180. Insulin sliding scale. Carb control diet.     Hypothyroidism - stable. continue synthroid.         ONCOLOGY:  Lung nodules - Improved. S/P resection. Follow up final pathology.      Pertinent clinical, laboratory, radiographic, hemodynamic, echocardiographic, respiratory data, microbiologic data and chart were reviewed by myself and analyzed frequently throughout the course of the day and night by myself.    Plan discussed at length with the CTICU staff and Attending CT Surgeon -   Dr Ambreen Magdaleno.      Patient's status was discussed with patient at bedside.           ________________________________________________    _________________________  VITAL SIGNS:  Vital Signs Last 24 Hrs  T(C): 36.7 (14 Sep 2021 20:00), Max: 36.9 (14 Sep 2021 06:17)  T(F): 98.1 (14 Sep 2021 20:00), Max: 98.4 (14 Sep 2021 06:17)  HR: 76 (14 Sep 2021 21:00) (73 - 83)  BP: 155/59 (14 Sep 2021 21:00) (133/56 - 167/65)  BP(mean): 75 (14 Sep 2021 21:00) (73 - 98)  RR: 18 (14 Sep 2021 21:00) (14 - 25)  SpO2: 97% (14 Sep 2021 21:00) (95% - 100%)  I/Os:   I&O's Detail    14 Sep 2021 07:01  -  14 Sep 2021 23:09  --------------------------------------------------------  IN:    Lactated Ringers: 330 mL  Total IN: 330 mL    OUT:    Chest Tube (mL): 100 mL    Voided (mL): 250 mL  Total OUT: 350 mL    Total NET: -20 mL              MEDICATIONS:  MEDICATIONS  (STANDING):  acetaminophen  IVPB .. 1000 milliGRAM(s) IV Intermittent once  atorvastatin 10 milliGRAM(s) Oral at bedtime  dextrose 50% Injectable 25 Gram(s) IV Push once  heparin   Injectable 5000 Unit(s) SubCutaneous every 8 hours  HYDROmorphone PCA (1 mG/mL) 30 milliLiter(s) PCA Continuous PCA Continuous  insulin lispro (ADMELOG) corrective regimen sliding scale   SubCutaneous three times a day before meals  lactated ringers. 1000 milliLiter(s) (30 mL/Hr) IV Continuous <Continuous>  levothyroxine 150 MICROGram(s) Oral daily  mirabegron ER 25 milliGRAM(s) Oral daily  pantoprazole    Tablet 40 milliGRAM(s) Oral before breakfast  pregabalin 50 milliGRAM(s) Oral daily  senna 2 Tablet(s) Oral at bedtime    MEDICATIONS  (PRN):  HYDROmorphone PCA (1 mG/mL) Rescue Clinician Bolus 0.5 milliGRAM(s) IV Push every 15 minutes PRN for Pain Scale GREATER THAN 6  naloxone Injectable 0.1 milliGRAM(s) IV Push every 3 minutes PRN For ANY of the following changes in patient status:  A. RR LESS THAN 10 breaths per minute, B. Oxygen saturation LESS THAN 90%, C. Sedation score of 6  ondansetron Injectable 4 milliGRAM(s) IV Push every 6 hours PRN Nausea      LABS:  Laboratory data was independently reviewed by me today.                       RADIOLOGY:   Radiology images were independently reviewed by me today. Reports were reviewed by me today.    Post operative CXR 9/14 reviewed by me. Chest tube in place. No significant pneumothorax

## 2021-09-14 NOTE — BRIEF OPERATIVE NOTE - NSICDXBRIEFPROCEDURE_GEN_ALL_CORE_FT
PROCEDURES:  Robotic assistance in thoracoscopic procedure 14-Sep-2021 11:53:22 FB, Robotic RVATS, RUL wedgex2, RLL wedgex1, Pneumonolysis Bree Baird

## 2021-09-14 NOTE — BRIEF OPERATIVE NOTE - COMMENTS
pt brought to pacu awaiting cti bed pt brought to pacu awaiting cti bed    I first assisted through out the entire case, including port placement, bedside assist while the surgeon at the console, and wound closure, JAG Weeks

## 2021-09-15 LAB
ANION GAP SERPL CALC-SCNC: 12 MMOL/L — SIGNIFICANT CHANGE UP (ref 7–14)
BASOPHILS # BLD AUTO: 0.02 K/UL — SIGNIFICANT CHANGE UP (ref 0–0.2)
BASOPHILS NFR BLD AUTO: 0.2 % — SIGNIFICANT CHANGE UP (ref 0–2)
BUN SERPL-MCNC: 15 MG/DL — SIGNIFICANT CHANGE UP (ref 7–23)
CALCIUM SERPL-MCNC: 8.6 MG/DL — SIGNIFICANT CHANGE UP (ref 8.4–10.5)
CHLORIDE SERPL-SCNC: 99 MMOL/L — SIGNIFICANT CHANGE UP (ref 98–107)
CO2 SERPL-SCNC: 29 MMOL/L — SIGNIFICANT CHANGE UP (ref 22–31)
CREAT SERPL-MCNC: 0.86 MG/DL — SIGNIFICANT CHANGE UP (ref 0.5–1.3)
EOSINOPHIL # BLD AUTO: 0 K/UL — SIGNIFICANT CHANGE UP (ref 0–0.5)
EOSINOPHIL NFR BLD AUTO: 0 % — SIGNIFICANT CHANGE UP (ref 0–6)
GLUCOSE SERPL-MCNC: 133 MG/DL — HIGH (ref 70–99)
HCT VFR BLD CALC: 35.3 % — SIGNIFICANT CHANGE UP (ref 34.5–45)
HGB BLD-MCNC: 11.2 G/DL — LOW (ref 11.5–15.5)
IANC: 7.14 K/UL — SIGNIFICANT CHANGE UP (ref 1.5–8.5)
IMM GRANULOCYTES NFR BLD AUTO: 0.5 % — SIGNIFICANT CHANGE UP (ref 0–1.5)
LYMPHOCYTES # BLD AUTO: 1.73 K/UL — SIGNIFICANT CHANGE UP (ref 1–3.3)
LYMPHOCYTES # BLD AUTO: 17.6 % — SIGNIFICANT CHANGE UP (ref 13–44)
MAGNESIUM SERPL-MCNC: 1.8 MG/DL — SIGNIFICANT CHANGE UP (ref 1.6–2.6)
MCHC RBC-ENTMCNC: 28.4 PG — SIGNIFICANT CHANGE UP (ref 27–34)
MCHC RBC-ENTMCNC: 31.7 GM/DL — LOW (ref 32–36)
MCV RBC AUTO: 89.6 FL — SIGNIFICANT CHANGE UP (ref 80–100)
MONOCYTES # BLD AUTO: 0.88 K/UL — SIGNIFICANT CHANGE UP (ref 0–0.9)
MONOCYTES NFR BLD AUTO: 9 % — SIGNIFICANT CHANGE UP (ref 2–14)
NEUTROPHILS # BLD AUTO: 7.14 K/UL — SIGNIFICANT CHANGE UP (ref 1.8–7.4)
NEUTROPHILS NFR BLD AUTO: 72.7 % — SIGNIFICANT CHANGE UP (ref 43–77)
NRBC # BLD: 0 /100 WBCS — SIGNIFICANT CHANGE UP
NRBC # FLD: 0 K/UL — SIGNIFICANT CHANGE UP
PLATELET # BLD AUTO: 128 K/UL — LOW (ref 150–400)
POTASSIUM SERPL-MCNC: 4.4 MMOL/L — SIGNIFICANT CHANGE UP (ref 3.5–5.3)
POTASSIUM SERPL-SCNC: 4.4 MMOL/L — SIGNIFICANT CHANGE UP (ref 3.5–5.3)
RBC # BLD: 3.94 M/UL — SIGNIFICANT CHANGE UP (ref 3.8–5.2)
RBC # FLD: 14.6 % — HIGH (ref 10.3–14.5)
SODIUM SERPL-SCNC: 140 MMOL/L — SIGNIFICANT CHANGE UP (ref 135–145)
WBC # BLD: 9.82 K/UL — SIGNIFICANT CHANGE UP (ref 3.8–10.5)
WBC # FLD AUTO: 9.82 K/UL — SIGNIFICANT CHANGE UP (ref 3.8–10.5)

## 2021-09-15 PROCEDURE — 99233 SBSQ HOSP IP/OBS HIGH 50: CPT

## 2021-09-15 PROCEDURE — 71045 X-RAY EXAM CHEST 1 VIEW: CPT | Mod: 26

## 2021-09-15 RX ORDER — SODIUM CHLORIDE 9 MG/ML
4 INJECTION INTRAMUSCULAR; INTRAVENOUS; SUBCUTANEOUS EVERY 6 HOURS
Refills: 0 | Status: DISCONTINUED | OUTPATIENT
Start: 2021-09-15 | End: 2021-09-16

## 2021-09-15 RX ORDER — MAGNESIUM SULFATE 500 MG/ML
2 VIAL (ML) INJECTION ONCE
Refills: 0 | Status: COMPLETED | OUTPATIENT
Start: 2021-09-15 | End: 2021-09-15

## 2021-09-15 RX ORDER — IPRATROPIUM/ALBUTEROL SULFATE 18-103MCG
3 AEROSOL WITH ADAPTER (GRAM) INHALATION ONCE
Refills: 0 | Status: DISCONTINUED | OUTPATIENT
Start: 2021-09-15 | End: 2021-09-16

## 2021-09-15 RX ORDER — METOPROLOL TARTRATE 50 MG
12.5 TABLET ORAL EVERY 12 HOURS
Refills: 0 | Status: DISCONTINUED | OUTPATIENT
Start: 2021-09-15 | End: 2021-09-18

## 2021-09-15 RX ORDER — ACETAMINOPHEN 500 MG
650 TABLET ORAL EVERY 6 HOURS
Refills: 0 | Status: COMPLETED | OUTPATIENT
Start: 2021-09-15 | End: 2021-09-17

## 2021-09-15 RX ORDER — OXYCODONE HYDROCHLORIDE 5 MG/1
5 TABLET ORAL
Refills: 0 | Status: DISCONTINUED | OUTPATIENT
Start: 2021-09-15 | End: 2021-09-17

## 2021-09-15 RX ORDER — LIDOCAINE 4 G/100G
1 CREAM TOPICAL EVERY 24 HOURS
Refills: 0 | Status: COMPLETED | OUTPATIENT
Start: 2021-09-15 | End: 2021-09-19

## 2021-09-15 RX ORDER — LIDOCAINE 4 G/100G
1 CREAM TOPICAL EVERY 24 HOURS
Refills: 0 | Status: DISCONTINUED | OUTPATIENT
Start: 2021-09-15 | End: 2021-09-15

## 2021-09-15 RX ADMIN — MIRABEGRON 25 MILLIGRAM(S): 50 TABLET, EXTENDED RELEASE ORAL at 11:20

## 2021-09-15 RX ADMIN — HEPARIN SODIUM 5000 UNIT(S): 5000 INJECTION INTRAVENOUS; SUBCUTANEOUS at 21:47

## 2021-09-15 RX ADMIN — LIDOCAINE 1 PATCH: 4 CREAM TOPICAL at 11:20

## 2021-09-15 RX ADMIN — ATORVASTATIN CALCIUM 10 MILLIGRAM(S): 80 TABLET, FILM COATED ORAL at 21:40

## 2021-09-15 RX ADMIN — Medication 650 MILLIGRAM(S): at 11:20

## 2021-09-15 RX ADMIN — Medication 650 MILLIGRAM(S): at 18:07

## 2021-09-15 RX ADMIN — LIDOCAINE 1 PATCH: 4 CREAM TOPICAL at 23:17

## 2021-09-15 RX ADMIN — LIDOCAINE 1 PATCH: 4 CREAM TOPICAL at 19:40

## 2021-09-15 RX ADMIN — SODIUM CHLORIDE 4 MILLILITER(S): 9 INJECTION INTRAMUSCULAR; INTRAVENOUS; SUBCUTANEOUS at 17:48

## 2021-09-15 RX ADMIN — HEPARIN SODIUM 5000 UNIT(S): 5000 INJECTION INTRAVENOUS; SUBCUTANEOUS at 14:45

## 2021-09-15 RX ADMIN — Medication 50 GRAM(S): at 09:41

## 2021-09-15 RX ADMIN — PANTOPRAZOLE SODIUM 40 MILLIGRAM(S): 20 TABLET, DELAYED RELEASE ORAL at 06:30

## 2021-09-15 RX ADMIN — OXYCODONE HYDROCHLORIDE 5 MILLIGRAM(S): 5 TABLET ORAL at 11:25

## 2021-09-15 RX ADMIN — HEPARIN SODIUM 5000 UNIT(S): 5000 INJECTION INTRAVENOUS; SUBCUTANEOUS at 06:31

## 2021-09-15 RX ADMIN — OXYCODONE HYDROCHLORIDE 5 MILLIGRAM(S): 5 TABLET ORAL at 12:55

## 2021-09-15 RX ADMIN — Medication 650 MILLIGRAM(S): at 17:29

## 2021-09-15 RX ADMIN — Medication 50 MILLIGRAM(S): at 11:22

## 2021-09-15 RX ADMIN — SODIUM CHLORIDE 4 MILLILITER(S): 9 INJECTION INTRAMUSCULAR; INTRAVENOUS; SUBCUTANEOUS at 22:38

## 2021-09-15 RX ADMIN — OXYCODONE HYDROCHLORIDE 5 MILLIGRAM(S): 5 TABLET ORAL at 22:10

## 2021-09-15 RX ADMIN — Medication 650 MILLIGRAM(S): at 12:54

## 2021-09-15 RX ADMIN — HYDROMORPHONE HYDROCHLORIDE 30 MILLILITER(S): 2 INJECTION INTRAMUSCULAR; INTRAVENOUS; SUBCUTANEOUS at 07:39

## 2021-09-15 RX ADMIN — OXYCODONE HYDROCHLORIDE 5 MILLIGRAM(S): 5 TABLET ORAL at 20:10

## 2021-09-15 RX ADMIN — Medication 150 MICROGRAM(S): at 06:31

## 2021-09-15 RX ADMIN — SENNA PLUS 2 TABLET(S): 8.6 TABLET ORAL at 21:40

## 2021-09-15 NOTE — PROGRESS NOTE ADULT - SUBJECTIVE AND OBJECTIVE BOX
Day _2__ of Anesthesia Pain Management Service    SUBJECTIVE:    Therapy:	  [x ] IV PCA	   [ ] Epidural           [ ] s/p Spinal Opoid              [ ] Postpartum infusion	  [ ] Patient controlled regional anesthesia (PCRA)    [ ] prn Analgesics    OBJECTIVE:   [ x] No new signs     [ ] Other:    Side Effects:  [x ] None			[ ] Other:    Assessment of Catheter Site:		[ ] Intact		[ ] Other:    ASSESSMENT/PLAN  [ ] Continue current therapy    [ x] Therapy changed to:    [ ] IV PCA       [ ] Epidural     [ x] prn Analgesics     Comments:

## 2021-09-15 NOTE — PHYSICAL THERAPY INITIAL EVALUATION ADULT - PRECAUTIONS/LIMITATIONS, REHAB EVAL
+2L of O2 through nasal cannula/cardiac precautions/fall precautions/oxygen therapy device and L/min/surgical precautions

## 2021-09-15 NOTE — PROGRESS NOTE ADULT - SUBJECTIVE AND OBJECTIVE BOX
ANAND BREEN      81y   Female   [id number]         No Known Allergies             Daily Height in cm: 175.26 (14 Sep 2021 13:57)    Daily Drug Dosing Weight  Height (cm): 175.3 (14 Sep 2021 13:57)  Weight (kg): 75.2 (14 Sep 2021 13:57)  BMI (kg/m2): 24.5 (14 Sep 2021 13:57)  BSA (m2): 1.91 (14 Sep 2021 13:57)    HPI:  81 y.o. female with h/o HTN, HLD, DM, hypothyroidism, presents to PST with preop diagnosis of solitary pulmonary nodule, c/o dyspnea on exertion, denies cough, hemoptysis, scheduled for robotic assisted right video assisted thoracoscopy, right lower lobe wedge resection, mediastinal lymph node dissection (10 Sep 2021 14:31).     Procedure:  Robotic RVATS, RUL wedgex2, RLL wedgex1, Pneumonolysis  14-Sep-2021                     Issues:              Lung nodule              Postop pain              Chest tube in place  HTN  HLD  DM2  Hypothyroidism  Depression              Home Medications:  atorvastatin 10 mg oral tablet: 1 tab(s) orally once a day am (14 Sep 2021 11:22)  Iron 100 Plus: 1  orally once a day (14 Sep 2021 06:46)  Lyrica 50 mg oral capsule: 1 cap(s) orally once a day (14 Sep 2021 06:46)  meloxicam 15 mg oral tablet: 1 tab(s) orally once a day, last dose 9/10/21 (14 Sep 2021 06:46)  metFORMIN 500 mg oral tablet: 1 tab(s) orally once a day am (14 Sep 2021 06:46)  metoprolol succinate 25 mg oral tablet, extended release: 1 tab(s) orally once a day (at bedtime) (14 Sep 2021 06:46)  Myrbetriq 25 mg oral tablet, extended release: 1 tab(s) orally once a day am (14 Sep 2021 06:46)  pantoprazole 40 mg oral delayed release tablet: 1 tab(s) orally once a day (14 Sep 2021 06:46)  raNITIdine 150 mg oral capsule: 1 cap(s) orally once a day pm (14 Sep 2021 06:46)  Synthroid 150 mcg (0.15 mg) oral tablet: 1 tab(s) orally once a day am (14 Sep 2021 06:46)  valsartan-hydrochlorothiazide 160mg-25mg oral tablet: 1 tab(s) orally once a day noon (14 Sep 2021 06:46)  Vitamin B12 1000 mcg oral tablet: 1 tab(s) orally once a day (14 Sep 2021 06:46)    PAST MEDICAL & SURGICAL HISTORY:  HTN (hypertension)    HLD (hyperlipidemia)    DM (diabetes mellitus)    Hypothyroidism    Solitary pulmonary nodule    Depression    History of appendectomy    S/P cholecystectomy    H/O total knee replacement  b/l knee    H/O arthroscopy  left shoulder      Vital Signs Last 24 Hrs  T(C): 36.8 (15 Sep 2021 08:00), Max: 36.8 (15 Sep 2021 04:00)  T(F): 98.3 (15 Sep 2021 08:00), Max: 98.3 (15 Sep 2021 08:00)  HR: 67 (15 Sep 2021 10:00) (67 - 83)  BP: 107/47 (15 Sep 2021 10:00) (103/44 - 167/65)  BP(mean): 62 (15 Sep 2021 10:00) (58 - 98)  RR: 13 (15 Sep 2021 10:00) (13 - 25)  SpO2: 98% (15 Sep 2021 10:00) (96% - 100%)  I&O's Detail    14 Sep 2021 07:01  -  15 Sep 2021 07:00  --------------------------------------------------------  IN:    Lactated Ringers: 600 mL  Total IN: 600 mL    OUT:    Chest Tube (mL): 180 mL    Voided (mL): 250 mL  Total OUT: 430 mL    Total NET: 170 mL      15 Sep 2021 07:01  -  15 Sep 2021 11:30  --------------------------------------------------------  IN:    Lactated Ringers: 60 mL  Total IN: 60 mL    OUT:  Total OUT: 0 mL    Total NET: 60 mL        CAPILLARY BLOOD GLUCOSE      POCT Blood Glucose.: 129 mg/dL (15 Sep 2021 11:13)  POCT Blood Glucose.: 124 mg/dL (15 Sep 2021 06:25)  POCT Blood Glucose.: 137 mg/dL (14 Sep 2021 17:00)  POCT Blood Glucose.: 149 mg/dL (14 Sep 2021 12:30)    Home Medications:  atorvastatin 10 mg oral tablet: 1 tab(s) orally once a day am (14 Sep 2021 11:22)  Iron 100 Plus: 1  orally once a day (14 Sep 2021 06:46)  Lyrica 50 mg oral capsule: 1 cap(s) orally once a day (14 Sep 2021 06:46)  meloxicam 15 mg oral tablet: 1 tab(s) orally once a day, last dose 9/10/21 (14 Sep 2021 06:46)  metFORMIN 500 mg oral tablet: 1 tab(s) orally once a day am (14 Sep 2021 06:46)  metoprolol succinate 25 mg oral tablet, extended release: 1 tab(s) orally once a day (at bedtime) (14 Sep 2021 06:46)  Myrbetriq 25 mg oral tablet, extended release: 1 tab(s) orally once a day am (14 Sep 2021 06:46)  pantoprazole 40 mg oral delayed release tablet: 1 tab(s) orally once a day (14 Sep 2021 06:46)  raNITIdine 150 mg oral capsule: 1 cap(s) orally once a day pm (14 Sep 2021 06:46)  Synthroid 150 mcg (0.15 mg) oral tablet: 1 tab(s) orally once a day am (14 Sep 2021 06:46)  valsartan-hydrochlorothiazide 160mg-25mg oral tablet: 1 tab(s) orally once a day noon (14 Sep 2021 06:46)  Vitamin B12 1000 mcg oral tablet: 1 tab(s) orally once a day (14 Sep 2021 06:46)    MEDICATIONS  (STANDING):  acetaminophen   Tablet .. 650 milliGRAM(s) Oral every 6 hours  albuterol/ipratropium for Nebulization. 3 milliLiter(s) Nebulizer once  atorvastatin 10 milliGRAM(s) Oral at bedtime  dextrose 50% Injectable 25 Gram(s) IV Push once  heparin   Injectable 5000 Unit(s) SubCutaneous every 8 hours  insulin lispro (ADMELOG) corrective regimen sliding scale   SubCutaneous three times a day before meals  lactated ringers. 1000 milliLiter(s) (30 mL/Hr) IV Continuous <Continuous>  levothyroxine 150 MICROGram(s) Oral daily  lidocaine   4% Patch 1 Patch Transdermal every 24 hours  metoprolol tartrate 12.5 milliGRAM(s) Oral every 12 hours  mirabegron ER 25 milliGRAM(s) Oral daily  pantoprazole    Tablet 40 milliGRAM(s) Oral before breakfast  pregabalin 50 milliGRAM(s) Oral daily  senna 2 Tablet(s) Oral at bedtime  sodium chloride 3%  Inhalation 4 milliLiter(s) Inhalation every 6 hours    MEDICATIONS  (PRN):  naloxone Injectable 0.1 milliGRAM(s) IV Push every 3 minutes PRN For ANY of the following changes in patient status:  A. RR LESS THAN 10 breaths per minute, B. Oxygen saturation LESS THAN 90%, C. Sedation score of 6  ondansetron Injectable 4 milliGRAM(s) IV Push every 6 hours PRN Nausea  oxyCODONE    IR 5 milliGRAM(s) Oral every 3 hours PRN Moderate and Severe Pain (7 - 10)        Physical exam:                             General:               Pt is awake, alert,  appears to be in pain but not in  distress                                                  Neuro:                  Nonfocal                             Cardiovascular:   S1 & S2, regular / irregular                          Respiratory:         Air entry is fair and equal on both sides, has bilateral conducted sounds                           GI:                          Soft, nondistended and nontender, Bowel sounds active                            Ext:                        No cyanosis or edema     Labs:                                                                           11.2   9.82  )-----------( 128      ( 15 Sep 2021 03:55 )             35.3             09-15    140  |  99  |  15  ----------------------------<  133<H>  4.4   |  29  |  0.86    Ca    8.6      15 Sep 2021 03:55  Mg     1.80     09-15                      Culture - Acid Fast - Tissue w/Smear (collected 14 Sep 2021 15:04)  Source: .Tissue RIGHT LOWER LOBE    Culture - Fungal, Tissue (collected 14 Sep 2021 15:04)  Source: .Tissue RIGHT LOWER LOBE  Preliminary Report (15 Sep 2021 06:22):    Testing in progress    Culture - Tissue with Gram Stain (collected 14 Sep 2021 15:04)  Source: .Tissue RIGHT LOWER LOBE  Gram Stain (14 Sep 2021 22:03):    No polymorphonuclear leukocytes seen per low power field    No organisms seen per oil power field    Culture - Acid Fast - Tissue w/Smear (collected 14 Sep 2021 15:02)  Source: .Tissue RIGHT UPPER LOBE    Culture - Fungal, Tissue (collected 14 Sep 2021 15:02)  Source: .Tissue RIGHT UPPER LOBE  Preliminary Report (15 Sep 2021 06:47):    Testing in progress    Culture - Tissue with Gram Stain (collected 14 Sep 2021 15:02)  Source: .Tissue RIGHT UPPER LOBE  Gram Stain (14 Sep 2021 22:08):    No polymorphonuclear leukocytes seen per low power field    No organisms seen per oil power field      CXR:  Postop changes, right chest tube in place.     Plan:  General: 81yFemale s/p Robotic RVATS, RUL wedgex2, RLL wedgex1, Pneumonolysis  14-Sep-2021 , experiencing  pain with deep breathing.                             Neuro:                                         Pain control with   Oxy /  Tylenol PRN                            Cardiovascular:                                          HTN: Continue hemodynamic monitoring and restart Low dose Lopressor.     HLD: On Lipitor                            Respiratory:                                         Pt is on 2L  nasal canula, wean off as tolerated.                                           Comfortable, not in any distress.                                         Encourage incentive spirometry. Start 3% saline inhalations                                          Monitor chest tube output                                         Chest tube to  water seal, check CXR                                                                   Continue bronchodilators, pulmonary toilet                            GI                                         On  DASH  diet as tolerated                                         Continue Zofran / Reglan for nausea - PRN	                                                                 Renal:                                         Continue LR  30cc/hr                                         Monitor I/Os and electrolytes                                                                                        Hem/ Onc:                                                                                  Monitor chest tube output &  signs of bleeding.                                          Follow CBC in AM                           Infectious disease:                                            Monitor for fever / leukocytosis.                                          All surgical incision / chest tube  sites look clean                            Endocrine                                             DM-2 / Hyperglycemia: Continue Accu-Checks with coverage  Hold oral meds    Pt is on SQ Heparin and Venodyne boots for DVT prophylaxis.     Pertinent clinical, laboratory, radiographic, hemodynamic, echocardiographic, respiratory data, microbiologic data and chart were reviewed and analyzed frequently throughout the course of the day and night  Patient seen, examined and plan discussed with CT Surgeon   / CTICU team during rounds.    OOB to chair and ambulate as tolerated.     Status discussed with patient /  updated plan of care    I have spent     minutes of critical care time with this pt between  7am and 11.59pm monitoring hemodynamic status, managing fluid resuscitation /  pressors to prevent / worsening of shock and ventilator management.           Kendrick Seals MD

## 2021-09-15 NOTE — PROGRESS NOTE ADULT - SUBJECTIVE AND OBJECTIVE BOX
Anesthesia Pain Management Service    SUBJECTIVE: Patient states she has some pain at this time. Patient states the IV PCA helps her. Primary RN states patient needs constant reminder to use IV PCA.  Pain Scale Score	At rest: 7/10___ 	With Activity: ___ 	[X ] Refer to charted pain scores    THERAPY:    [ ] IV PCA Morphine		[ ] 5 mg/mL	[ ] 1 mg/mL  [X ] IV PCA Hydromorphone	[ ] 5 mg/mL	[X ] 1 mg/mL  [ ] IV PCA Fentanyl		[ ] 50 micrograms/mL    Demand dose __0.2_ lockout __6_ (minutes) Continuous Rate _0__ Total: _1.6__   mg used (in past 24 hrs)      MEDICATIONS  (STANDING):  acetaminophen   Tablet .. 650 milliGRAM(s) Oral every 6 hours  albuterol/ipratropium for Nebulization. 3 milliLiter(s) Nebulizer once  atorvastatin 10 milliGRAM(s) Oral at bedtime  dextrose 50% Injectable 25 Gram(s) IV Push once  heparin   Injectable 5000 Unit(s) SubCutaneous every 8 hours  insulin lispro (ADMELOG) corrective regimen sliding scale   SubCutaneous three times a day before meals  lactated ringers. 1000 milliLiter(s) (30 mL/Hr) IV Continuous <Continuous>  levothyroxine 150 MICROGram(s) Oral daily  lidocaine   4% Patch 1 Patch Transdermal every 24 hours  magnesium sulfate  IVPB 2 Gram(s) IV Intermittent once  metoprolol tartrate 12.5 milliGRAM(s) Oral every 12 hours  mirabegron ER 25 milliGRAM(s) Oral daily  pantoprazole    Tablet 40 milliGRAM(s) Oral before breakfast  pregabalin 50 milliGRAM(s) Oral daily  senna 2 Tablet(s) Oral at bedtime  sodium chloride 3%  Inhalation 4 milliLiter(s) Inhalation every 6 hours    MEDICATIONS  (PRN):  naloxone Injectable 0.1 milliGRAM(s) IV Push every 3 minutes PRN For ANY of the following changes in patient status:  A. RR LESS THAN 10 breaths per minute, B. Oxygen saturation LESS THAN 90%, C. Sedation score of 6  ondansetron Injectable 4 milliGRAM(s) IV Push every 6 hours PRN Nausea      OBJECTIVE: CTX1 in place. Patient sitting up in chair.    Sedation Score:	[ X] Alert	[ ] Drowsy 	[ ] Arousable	[ ] Asleep	[ ] Unresponsive    Side Effects:	[X ] None	[ ] Nausea	[ ] Vomiting	[ ] Pruritus  		[ ] Other:    Vital Signs Last 24 Hrs  T(C): 36.8 (15 Sep 2021 08:00), Max: 36.8 (15 Sep 2021 04:00)  T(F): 98.3 (15 Sep 2021 08:00), Max: 98.3 (15 Sep 2021 08:00)  HR: 69 (15 Sep 2021 09:00) (68 - 83)  BP: 103/44 (15 Sep 2021 09:00) (103/44 - 167/65)  BP(mean): 58 (15 Sep 2021 09:00) (58 - 98)  RR: 17 (15 Sep 2021 09:00) (15 - 25)  SpO2: 98% (15 Sep 2021 09:00) (96% - 100%)    ASSESSMENT/ PLAN    Therapy to  be:	[ ] Continue   [ X] Discontinued   [X ] Change to prn Analgesics    Documentation and Verification of current medications:   [X] Done	[ ] Not done, not elligible    Comments: Discussed patient with primary team. IV PCA discontinued PRN Oral/IV opioids and/or Adjuvant non-opioid medication to be ordered at this point.    Progress Note written now but Patient was seen earlier.

## 2021-09-15 NOTE — PHYSICAL THERAPY INITIAL EVALUATION ADULT - GENERAL OBSERVATIONS, REHAB EVAL
Pt encountered seated in chair, no distress, AxOx4, with +IV, +tele, +chest tube, and +2L of O2 through nasal cannula

## 2021-09-15 NOTE — PHYSICAL THERAPY INITIAL EVALUATION ADULT - ADDITIONAL COMMENTS
Pt reports that she lives in a private house with her son and daughter on the first floor. When asked about steps in the front of the house pt was unsure. Prior to hospital admission pt was completely independent and used a rollator with ambulation. Pt denies any recent falls. ( Alena used)    Pt left comfortable in chair, NAD, all lines intact, all precautions maintained, with call bell in reach, and RN aware of PT evaluation.

## 2021-09-15 NOTE — PHYSICAL THERAPY INITIAL EVALUATION ADULT - PATIENT PROFILE REVIEW, REHAB EVAL
ACTIVITY: OOB to Chair; spoke with TOMMY Vogt prior to PT evaluation--> Pt OK for PT consult/OOB activity./yes

## 2021-09-15 NOTE — PHYSICAL THERAPY INITIAL EVALUATION ADULT - DIAGNOSIS, PT EVAL
Pt s/p Robotic assistance in thoracoscopic procedure, wedgex2, Right Lower Lobe wedgex1 on 09/14/2021; pt presents with decreased strength, decreased balance, and decreased aerobic capacity/endurance.

## 2021-09-16 LAB
ANION GAP SERPL CALC-SCNC: 11 MMOL/L — SIGNIFICANT CHANGE UP (ref 7–14)
ANION GAP SERPL CALC-SCNC: 11 MMOL/L — SIGNIFICANT CHANGE UP (ref 7–14)
APPEARANCE UR: CLEAR — SIGNIFICANT CHANGE UP
BACTERIA # UR AUTO: NEGATIVE — SIGNIFICANT CHANGE UP
BILIRUB UR-MCNC: NEGATIVE — SIGNIFICANT CHANGE UP
BLOOD GAS ARTERIAL - LYTES,HGB,ICA,LACT RESULT: SIGNIFICANT CHANGE UP
BUN SERPL-MCNC: 24 MG/DL — HIGH (ref 7–23)
BUN SERPL-MCNC: 25 MG/DL — HIGH (ref 7–23)
CALCIUM SERPL-MCNC: 8.2 MG/DL — LOW (ref 8.4–10.5)
CALCIUM SERPL-MCNC: 8.6 MG/DL — SIGNIFICANT CHANGE UP (ref 8.4–10.5)
CHLORIDE SERPL-SCNC: 97 MMOL/L — LOW (ref 98–107)
CHLORIDE SERPL-SCNC: 98 MMOL/L — SIGNIFICANT CHANGE UP (ref 98–107)
CO2 SERPL-SCNC: 27 MMOL/L — SIGNIFICANT CHANGE UP (ref 22–31)
CO2 SERPL-SCNC: 28 MMOL/L — SIGNIFICANT CHANGE UP (ref 22–31)
COLOR SPEC: YELLOW — SIGNIFICANT CHANGE UP
CREAT SERPL-MCNC: 1.08 MG/DL — SIGNIFICANT CHANGE UP (ref 0.5–1.3)
CREAT SERPL-MCNC: 1.36 MG/DL — HIGH (ref 0.5–1.3)
DIFF PNL FLD: NEGATIVE — SIGNIFICANT CHANGE UP
EPI CELLS # UR: 1 /HPF — SIGNIFICANT CHANGE UP (ref 0–5)
GLUCOSE SERPL-MCNC: 114 MG/DL — HIGH (ref 70–99)
GLUCOSE SERPL-MCNC: 115 MG/DL — HIGH (ref 70–99)
GLUCOSE UR QL: NEGATIVE — SIGNIFICANT CHANGE UP
HCT VFR BLD CALC: 35.6 % — SIGNIFICANT CHANGE UP (ref 34.5–45)
HCT VFR BLD CALC: 35.6 % — SIGNIFICANT CHANGE UP (ref 34.5–45)
HGB BLD-MCNC: 11.2 G/DL — LOW (ref 11.5–15.5)
HGB BLD-MCNC: 11.2 G/DL — LOW (ref 11.5–15.5)
HYALINE CASTS # UR AUTO: 5 /LPF — SIGNIFICANT CHANGE UP (ref 0–7)
KETONES UR-MCNC: NEGATIVE — SIGNIFICANT CHANGE UP
LEUKOCYTE ESTERASE UR-ACNC: NEGATIVE — SIGNIFICANT CHANGE UP
MAGNESIUM SERPL-MCNC: 2.7 MG/DL — HIGH (ref 1.6–2.6)
MCHC RBC-ENTMCNC: 28.6 PG — SIGNIFICANT CHANGE UP (ref 27–34)
MCHC RBC-ENTMCNC: 29.1 PG — SIGNIFICANT CHANGE UP (ref 27–34)
MCHC RBC-ENTMCNC: 31.5 GM/DL — LOW (ref 32–36)
MCHC RBC-ENTMCNC: 31.5 GM/DL — LOW (ref 32–36)
MCV RBC AUTO: 91 FL — SIGNIFICANT CHANGE UP (ref 80–100)
MCV RBC AUTO: 92.5 FL — SIGNIFICANT CHANGE UP (ref 80–100)
NITRITE UR-MCNC: NEGATIVE — SIGNIFICANT CHANGE UP
NRBC # BLD: 0 /100 WBCS — SIGNIFICANT CHANGE UP
NRBC # BLD: 0 /100 WBCS — SIGNIFICANT CHANGE UP
NRBC # FLD: 0 K/UL — SIGNIFICANT CHANGE UP
NRBC # FLD: 0 K/UL — SIGNIFICANT CHANGE UP
PH UR: 6 — SIGNIFICANT CHANGE UP (ref 5–8)
PHOSPHATE SERPL-MCNC: 5.1 MG/DL — HIGH (ref 2.5–4.5)
PLATELET # BLD AUTO: 118 K/UL — LOW (ref 150–400)
PLATELET # BLD AUTO: 124 K/UL — LOW (ref 150–400)
POTASSIUM SERPL-MCNC: 4 MMOL/L — SIGNIFICANT CHANGE UP (ref 3.5–5.3)
POTASSIUM SERPL-MCNC: 4.1 MMOL/L — SIGNIFICANT CHANGE UP (ref 3.5–5.3)
POTASSIUM SERPL-SCNC: 4 MMOL/L — SIGNIFICANT CHANGE UP (ref 3.5–5.3)
POTASSIUM SERPL-SCNC: 4.1 MMOL/L — SIGNIFICANT CHANGE UP (ref 3.5–5.3)
PROT UR-MCNC: ABNORMAL
RBC # BLD: 3.85 M/UL — SIGNIFICANT CHANGE UP (ref 3.8–5.2)
RBC # BLD: 3.91 M/UL — SIGNIFICANT CHANGE UP (ref 3.8–5.2)
RBC # FLD: 14.7 % — HIGH (ref 10.3–14.5)
RBC # FLD: 14.9 % — HIGH (ref 10.3–14.5)
RBC CASTS # UR COMP ASSIST: 3 /HPF — SIGNIFICANT CHANGE UP (ref 0–4)
SODIUM SERPL-SCNC: 136 MMOL/L — SIGNIFICANT CHANGE UP (ref 135–145)
SODIUM SERPL-SCNC: 136 MMOL/L — SIGNIFICANT CHANGE UP (ref 135–145)
SP GR SPEC: 1.02 — SIGNIFICANT CHANGE UP (ref 1–1.05)
UROBILINOGEN FLD QL: SIGNIFICANT CHANGE UP
WBC # BLD: 10.05 K/UL — SIGNIFICANT CHANGE UP (ref 3.8–10.5)
WBC # BLD: 8.97 K/UL — SIGNIFICANT CHANGE UP (ref 3.8–10.5)
WBC # FLD AUTO: 10.05 K/UL — SIGNIFICANT CHANGE UP (ref 3.8–10.5)
WBC # FLD AUTO: 8.97 K/UL — SIGNIFICANT CHANGE UP (ref 3.8–10.5)
WBC UR QL: 6 /HPF — HIGH (ref 0–5)

## 2021-09-16 PROCEDURE — 36569 INSJ PICC 5 YR+ W/O IMAGING: CPT

## 2021-09-16 PROCEDURE — 71045 X-RAY EXAM CHEST 1 VIEW: CPT | Mod: 26,76

## 2021-09-16 PROCEDURE — 74018 RADEX ABDOMEN 1 VIEW: CPT | Mod: 26

## 2021-09-16 PROCEDURE — 99233 SBSQ HOSP IP/OBS HIGH 50: CPT

## 2021-09-16 PROCEDURE — 71045 X-RAY EXAM CHEST 1 VIEW: CPT | Mod: 26,77

## 2021-09-16 RX ORDER — VANCOMYCIN HCL 1 G
1000 VIAL (EA) INTRAVENOUS EVERY 24 HOURS
Refills: 0 | Status: DISCONTINUED | OUTPATIENT
Start: 2021-09-16 | End: 2021-09-18

## 2021-09-16 RX ORDER — PIPERACILLIN AND TAZOBACTAM 4; .5 G/20ML; G/20ML
3.38 INJECTION, POWDER, LYOPHILIZED, FOR SOLUTION INTRAVENOUS ONCE
Refills: 0 | Status: COMPLETED | OUTPATIENT
Start: 2021-09-16 | End: 2021-09-16

## 2021-09-16 RX ORDER — DORNASE ALFA 1 MG/ML
2.5 SOLUTION RESPIRATORY (INHALATION) EVERY 12 HOURS
Refills: 0 | Status: DISCONTINUED | OUTPATIENT
Start: 2021-09-16 | End: 2021-09-16

## 2021-09-16 RX ORDER — SODIUM CHLORIDE 9 MG/ML
4 INJECTION INTRAMUSCULAR; INTRAVENOUS; SUBCUTANEOUS EVERY 6 HOURS
Refills: 0 | Status: DISCONTINUED | OUTPATIENT
Start: 2021-09-16 | End: 2021-09-17

## 2021-09-16 RX ORDER — DORNASE ALFA 1 MG/ML
2.5 SOLUTION RESPIRATORY (INHALATION) EVERY 12 HOURS
Refills: 0 | Status: DISCONTINUED | OUTPATIENT
Start: 2021-09-16 | End: 2021-09-17

## 2021-09-16 RX ORDER — ALBUTEROL 90 UG/1
2.5 AEROSOL, METERED ORAL EVERY 6 HOURS
Refills: 0 | Status: DISCONTINUED | OUTPATIENT
Start: 2021-09-16 | End: 2021-09-20

## 2021-09-16 RX ORDER — POLYETHYLENE GLYCOL 3350 17 G/17G
17 POWDER, FOR SOLUTION ORAL ONCE
Refills: 0 | Status: COMPLETED | OUTPATIENT
Start: 2021-09-16 | End: 2021-09-16

## 2021-09-16 RX ORDER — PIPERACILLIN AND TAZOBACTAM 4; .5 G/20ML; G/20ML
3.38 INJECTION, POWDER, LYOPHILIZED, FOR SOLUTION INTRAVENOUS EVERY 8 HOURS
Refills: 0 | Status: DISCONTINUED | OUTPATIENT
Start: 2021-09-16 | End: 2021-09-20

## 2021-09-16 RX ORDER — ACETAMINOPHEN 500 MG
1000 TABLET ORAL ONCE
Refills: 0 | Status: COMPLETED | OUTPATIENT
Start: 2021-09-16 | End: 2021-09-16

## 2021-09-16 RX ORDER — ACETAMINOPHEN 500 MG
1000 TABLET ORAL ONCE
Refills: 0 | Status: DISCONTINUED | OUTPATIENT
Start: 2021-09-16 | End: 2021-09-20

## 2021-09-16 RX ORDER — ALBUMIN HUMAN 25 %
250 VIAL (ML) INTRAVENOUS ONCE
Refills: 0 | Status: COMPLETED | OUTPATIENT
Start: 2021-09-16 | End: 2021-09-16

## 2021-09-16 RX ORDER — METOCLOPRAMIDE HCL 10 MG
10 TABLET ORAL ONCE
Refills: 0 | Status: COMPLETED | OUTPATIENT
Start: 2021-09-16 | End: 2021-09-16

## 2021-09-16 RX ADMIN — SODIUM CHLORIDE 4 MILLILITER(S): 9 INJECTION INTRAMUSCULAR; INTRAVENOUS; SUBCUTANEOUS at 03:42

## 2021-09-16 RX ADMIN — SENNA PLUS 2 TABLET(S): 8.6 TABLET ORAL at 22:44

## 2021-09-16 RX ADMIN — DORNASE ALFA 2.5 MILLIGRAM(S): 1 SOLUTION RESPIRATORY (INHALATION) at 11:27

## 2021-09-16 RX ADMIN — SODIUM CHLORIDE 4 MILLILITER(S): 9 INJECTION INTRAMUSCULAR; INTRAVENOUS; SUBCUTANEOUS at 17:10

## 2021-09-16 RX ADMIN — Medication 125 MILLILITER(S): at 23:56

## 2021-09-16 RX ADMIN — Medication 650 MILLIGRAM(S): at 07:30

## 2021-09-16 RX ADMIN — HEPARIN SODIUM 5000 UNIT(S): 5000 INJECTION INTRAVENOUS; SUBCUTANEOUS at 22:44

## 2021-09-16 RX ADMIN — Medication 10 MILLIGRAM(S): at 12:22

## 2021-09-16 RX ADMIN — Medication 650 MILLIGRAM(S): at 12:50

## 2021-09-16 RX ADMIN — Medication 1000 MILLIGRAM(S): at 19:00

## 2021-09-16 RX ADMIN — HEPARIN SODIUM 5000 UNIT(S): 5000 INJECTION INTRAVENOUS; SUBCUTANEOUS at 13:09

## 2021-09-16 RX ADMIN — SODIUM CHLORIDE 4 MILLILITER(S): 9 INJECTION INTRAMUSCULAR; INTRAVENOUS; SUBCUTANEOUS at 11:19

## 2021-09-16 RX ADMIN — Medication 150 MICROGRAM(S): at 06:55

## 2021-09-16 RX ADMIN — SODIUM CHLORIDE 4 MILLILITER(S): 9 INJECTION INTRAMUSCULAR; INTRAVENOUS; SUBCUTANEOUS at 21:32

## 2021-09-16 RX ADMIN — Medication 12.5 MILLIGRAM(S): at 07:08

## 2021-09-16 RX ADMIN — Medication 650 MILLIGRAM(S): at 12:23

## 2021-09-16 RX ADMIN — Medication 125 MILLILITER(S): at 12:25

## 2021-09-16 RX ADMIN — Medication 650 MILLIGRAM(S): at 00:11

## 2021-09-16 RX ADMIN — ALBUTEROL 2.5 MILLIGRAM(S): 90 AEROSOL, METERED ORAL at 17:09

## 2021-09-16 RX ADMIN — PIPERACILLIN AND TAZOBACTAM 200 GRAM(S): 4; .5 INJECTION, POWDER, LYOPHILIZED, FOR SOLUTION INTRAVENOUS at 21:47

## 2021-09-16 RX ADMIN — HEPARIN SODIUM 5000 UNIT(S): 5000 INJECTION INTRAVENOUS; SUBCUTANEOUS at 06:54

## 2021-09-16 RX ADMIN — ATORVASTATIN CALCIUM 10 MILLIGRAM(S): 80 TABLET, FILM COATED ORAL at 22:44

## 2021-09-16 RX ADMIN — ALBUTEROL 2.5 MILLIGRAM(S): 90 AEROSOL, METERED ORAL at 11:19

## 2021-09-16 RX ADMIN — PANTOPRAZOLE SODIUM 40 MILLIGRAM(S): 20 TABLET, DELAYED RELEASE ORAL at 06:55

## 2021-09-16 RX ADMIN — DORNASE ALFA 2.5 MILLIGRAM(S): 1 SOLUTION RESPIRATORY (INHALATION) at 21:32

## 2021-09-16 RX ADMIN — Medication 400 MILLIGRAM(S): at 18:30

## 2021-09-16 RX ADMIN — ALBUTEROL 2.5 MILLIGRAM(S): 90 AEROSOL, METERED ORAL at 21:31

## 2021-09-16 RX ADMIN — LIDOCAINE 1 PATCH: 4 CREAM TOPICAL at 19:26

## 2021-09-16 RX ADMIN — Medication 650 MILLIGRAM(S): at 01:18

## 2021-09-16 RX ADMIN — Medication 50 MILLIGRAM(S): at 22:44

## 2021-09-16 RX ADMIN — LIDOCAINE 1 PATCH: 4 CREAM TOPICAL at 12:22

## 2021-09-16 RX ADMIN — Medication 250 MILLIGRAM(S): at 21:48

## 2021-09-16 RX ADMIN — Medication 650 MILLIGRAM(S): at 07:00

## 2021-09-16 RX ADMIN — POLYETHYLENE GLYCOL 3350 17 GRAM(S): 17 POWDER, FOR SOLUTION ORAL at 12:25

## 2021-09-16 NOTE — PROGRESS NOTE ADULT - SUBJECTIVE AND OBJECTIVE BOX
CHIEF COMPLAINT: FOLLOW UP IN ICU FOR POSTOPERATIVE CARE OF PATIENT WHO IS S/P multiple wedge resections      PROCEDURES:  Robotic RVATS, RUL wedgex2, RLL wedgex1, Pneumonolysis  14-Sep-2021       ISSUES:   GARLAND  Lung nodule  Post op pain  Chest tube in place  HTN  HLD  DM2  Hypothyroidism  Depression    INTERVAL EVENTS:   GARLAND on renal function. Murphy placed for urinary retention.  Chest tube with no airleak. Chest tube to be removed.  Distended abdomen, abd XR with stool. given bowel meds.    HISTORY:   Patient reports moderate pain at chest wall incision sites which is worse with coughing and deep breathing without associated fever or dyspnea. Pain is improved with use of pain meds.     PHYSICAL EXAM:   Gen: Comfortable, No acute distress  Eyes: Sclera white, Conjunctiva normal, Eyelids normal, Pupils symmetrical   ENT: Mucous membranes moist,  ,  ,    Neck: Trachea midline,  ,  ,  ,  ,    CV: Rate regular, Rhythm regular,  ,  ,    Resp: Breath sounds clear, No accessory muscles use, R chest tube in place,  ,    Abd: Soft, distended, Non-tender, Bowel sounds normal,  ,  ,    Skin: Warm, No peripheral edema of lower extremities,  ,    : No murphy  Neuro: Moving all 4 extremities,    Psych: A&Ox3      ASSESSMENT AND PLAN:     NEURO:  Post-operative Pain - Stable. Pain control with oxycodone and Tylenol IV PRN.        RESPIRATORY:  Hypoxia - Wean nasal cannula for goal O2sat above 92. Obtain CXR. Incentive spirometry. Chest PT and frequent suctioning. Continue bronchodilators. OOB to chair & ambulate w/ assistance. Continuous pulse oximetry for support & to prevent decompensation.     Depression - stable. continue psych meds.    Chest tube – Discontinue chest tube    CARDIOVASCULAR:  Hemodynamically stable - Not on pressors. Continue hemodynamic monitoring.  Telemetry (medical test) - Reviewed by me today independently. Normal sinus rhythm.  HTN - stable. Hold home antihypertensives for now.         RENAL:  GARLAND – Renally dose medications. Monitor for hyperkalemia and uremia. Avoid nephrotoxic medications. Monitor IOs and electrolytes.           GASTROINTESTINAL:  GI prophylaxis not indicated  Zofran and Reglan IV PRN for nausea  Regular consistency diet        GERD - stable. Continue famotidine    HEMATOLOGIC:  No signs of active bleeding. Monitor Hgb in CBC in AM  DVT prophylaxis with heparin subQ and SCDs.           INFECTIOUS DISEASE:  All surgical sites appear clean. Will monitor for fever and leukocytosis.       ENDOCRINE:  DM2 – Stable. Monitor glucose fingersticks for goal 120-180. Insulin sliding scale. Carb control diet.     Hypothyroidism - stable. continue synthroid.         ONCOLOGY:  Lung nodules - Improved. S/P resection. Follow up final pathology.      Pertinent clinical, laboratory, radiographic, hemodynamic, echocardiographic, respiratory data, microbiologic data and chart were reviewed by myself and analyzed frequently throughout the course of the day and night by myself.    Plan discussed at length with the CTICU staff and Attending CT Surgeon -   Dr Ambreen Magdaleno.      Patient's status was discussed with patient at bedside.     _________________________  VITAL SIGNS:  Vital Signs Last 24 Hrs  T(C): 36.4 (16 Sep 2021 12:00), Max: 36.8 (15 Sep 2021 16:00)  T(F): 97.5 (16 Sep 2021 12:00), Max: 98.3 (15 Sep 2021 16:00)  HR: 94 (16 Sep 2021 14:00) (66 - 95)  BP: 116/40 (16 Sep 2021 14:00) (92/47 - 142/77)  BP(mean): 60 (16 Sep 2021 14:00) (55 - 112)  RR: 26 (16 Sep 2021 14:00) (16 - 34)  SpO2: 95% (16 Sep 2021 14:00) (92% - 100%)  I/Os:   I&O's Detail    15 Sep 2021 07:01  -  16 Sep 2021 07:00  --------------------------------------------------------  IN:    Lactated Ringers: 720 mL    Oral Fluid: 720 mL  Total IN: 1440 mL    OUT:    Chest Tube (mL): 60 mL    Voided (mL): 350 mL  Total OUT: 410 mL    Total NET: 1030 mL      16 Sep 2021 07:01  -  16 Sep 2021 14:51  --------------------------------------------------------  IN:    Albumin 5%  - 250 mL: 250 mL    Lactated Ringers: 30 mL    Oral Fluid: 360 mL  Total IN: 640 mL    OUT:    Chest Tube (mL): 65 mL    Indwelling Catheter - Urethral (mL): 350 mL    Voided (mL): 200 mL  Total OUT: 615 mL    Total NET: 25 mL              MEDICATIONS:  MEDICATIONS  (STANDING):  acetaminophen   Tablet .. 650 milliGRAM(s) Oral every 6 hours  acetaminophen  IVPB .. 1000 milliGRAM(s) IV Intermittent once  ALBUTerol    0.083% 2.5 milliGRAM(s) Nebulizer every 6 hours  atorvastatin 10 milliGRAM(s) Oral at bedtime  dextrose 50% Injectable 25 Gram(s) IV Push once  dornase bruna Solution 2.5 milliGRAM(s) Inhalation every 12 hours  heparin   Injectable 5000 Unit(s) SubCutaneous every 8 hours  insulin lispro (ADMELOG) corrective regimen sliding scale   SubCutaneous three times a day before meals  levothyroxine 150 MICROGram(s) Oral daily  lidocaine   4% Patch 1 Patch Transdermal every 24 hours  metoprolol tartrate 12.5 milliGRAM(s) Oral every 12 hours  pantoprazole    Tablet 40 milliGRAM(s) Oral before breakfast  pregabalin 50 milliGRAM(s) Oral daily  senna 2 Tablet(s) Oral at bedtime  sodium chloride 3%  Inhalation 4 milliLiter(s) Inhalation every 6 hours    MEDICATIONS  (PRN):  bisacodyl Suppository 10 milliGRAM(s) Rectal daily PRN Constipation  naloxone Injectable 0.1 milliGRAM(s) IV Push every 3 minutes PRN For ANY of the following changes in patient status:  A. RR LESS THAN 10 breaths per minute, B. Oxygen saturation LESS THAN 90%, C. Sedation score of 6  ondansetron Injectable 4 milliGRAM(s) IV Push every 6 hours PRN Nausea  oxyCODONE    IR 5 milliGRAM(s) Oral every 3 hours PRN Moderate and Severe Pain (7 - 10)      LABS:  Laboratory data was independently reviewed by me today.                           11.2   10.05 )-----------( 124      ( 16 Sep 2021 10:48 )             35.6     09-16    136  |  97<L>  |  25<H>  ----------------------------<  115<H>  4.0   |  28  |  1.36<H>    Ca    8.6      16 Sep 2021 08:40  Phos  5.1     09-16  Mg     2.70     09-16                RADIOLOGY:   Radiology images were independently reviewed by me today. Reports were reviewed by me today.    Xray Chest 1 View- PORTABLE-Urgent:   EXAM:  XR CHEST PORTABLE URGENT 1V      EXAM:  XR CHEST PORTABLE ROUTINE 1V        PROCEDURE DATE:  Sep 16 2021         INTERPRETATION:  TIME OF EXAM: September 16, 2021 at 6:08 AM and 10:45 AM    CLINICAL INFORMATION: Post thoracotomy pre and postchest tube removal.    TECHNIQUE:   Portable chest    INTERPRETATION:    6:08 AM:  Right-sided chest tube in place. Lungs are clear. Heart size is stable. No pneumothorax.    10:45 AM:  Right chest tube has been removed. No complications associated with this maneuver. Heart size is stable.      COMPARISON:  September 15      IMPRESSION:  Follow-up post right thoracotomy pre and post chest tube removal.    --- End of Report ---              GUSTAVO SHAFFER MD; Attending Radiologist  This document has been electronically signed. Sep 16 2021 11:48AM (09-16-21 @ 10:46)  Xray Chest 1 View- PORTABLE-Routine:   EXAM:  XR CHEST PORTABLE URGENT 1V      EXAM:  XR CHEST PORTABLE ROUTINE 1V        PROCEDURE DATE:  Sep 16 2021         INTERPRETATION:  TIME OF EXAM: September 16, 2021 at 6:08 AM and 10:45 AM    CLINICAL INFORMATION: Post thoracotomy pre and postchest tube removal.    TECHNIQUE:   Portable chest    INTERPRETATION:    6:08 AM:  Right-sided chest tube in place. Lungs are clear. Heart size is stable. No pneumothorax.    10:45 AM:  Right chest tube has been removed. No complications associated with this maneuver. Heart size is stable.      COMPARISON:  September 15      IMPRESSION:  Follow-up post right thoracotomy pre and post chest tube removal.    --- End of Report ---              GUSTAVO SHAFFER MD; Attending Radiologist  This document has been electronically signed. Sep 16 2021 11:48AM (09-16-21 @ 07:01)  Xray Chest 1 View- PORTABLE-Urgent:   EXAM:  XR CHEST PORTABLE URGENT 1V      EXAM:  XR CHEST AP OR PA 1V      EXAM:  XR CHEST PORTABLE URGENT 1V        PROCEDURE DATE:  Sep 14 2021         INTERPRETATION:  TIME OF EXAM: September 14, 2021 at 12:17 PM; September 15 at 6:19 AM and 11:34AM    CLINICAL INFORMATION: Follow-up post right thoracotomy and placement of chest tube on waterseal.    TECHNIQUE:   Portable chest    INTERPRETATION:    September 14:  12:17 PM:  Right-sided chest tube is present. No right effusion or pneumothorax. Hazy left lung base may represent small effusion. The heart is not enlarged.    September 15:  6:19 AM:  Right-sided chest tube remains in position. No complicating effusion or pneumothorax. Small left effusion again suggested.    11:34 AM:  Tiny apical pneumothorax is appreciated on this study. Lungs remain free of focal consolidations.      COMPARISON:  None available      IMPRESSION:  Status post right thoracotomy with chest tube pre and post tube placed on waterseal with most recent study showing tiny apical pneumothorax.    --- End of Report ---              GUSTAVO SHAFFER MD; Attending Radiologist  This document has been electronically signed. Sep 15 2021 12:31PM (09-15-21 @ 11:34)

## 2021-09-16 NOTE — DIETITIAN INITIAL EVALUATION ADULT. - PERTINENT MEDS FT
MEDICATIONS  (STANDING):  acetaminophen   Tablet .. 650 milliGRAM(s) Oral every 6 hours  acetaminophen  IVPB .. 1000 milliGRAM(s) IV Intermittent once  albumin human  5% IVPB 250 milliLiter(s) IV Intermittent once  ALBUTerol    0.083% 2.5 milliGRAM(s) Nebulizer every 6 hours  atorvastatin 10 milliGRAM(s) Oral at bedtime  dextrose 50% Injectable 25 Gram(s) IV Push once  dornase bruna Solution 2.5 milliGRAM(s) Inhalation every 12 hours  heparin   Injectable 5000 Unit(s) SubCutaneous every 8 hours  insulin lispro (ADMELOG) corrective regimen sliding scale   SubCutaneous three times a day before meals  levothyroxine 150 MICROGram(s) Oral daily  lidocaine   4% Patch 1 Patch Transdermal every 24 hours  metoprolol tartrate 12.5 milliGRAM(s) Oral every 12 hours  pantoprazole    Tablet 40 milliGRAM(s) Oral before breakfast  pregabalin 50 milliGRAM(s) Oral daily  senna 2 Tablet(s) Oral at bedtime  sodium chloride 3%  Inhalation 4 milliLiter(s) Inhalation every 6 hours

## 2021-09-16 NOTE — DIETITIAN INITIAL EVALUATION ADULT. - OTHER INFO
82 y/o POD 2 s/p Robotic RVATS, RUL wedgex2, RLL wedgex1, Pneumonolysis. As per RN, pt is currently NPO due to abdominal distension and full of stool. Pt is on an aggressive bowel regimen of dulcolax, colace, and senna. Pt denies any current abdominal pain. No nausea. Denies any chewing or swallowing difficulties at this time. NKFA. UBW reported 170 pounds with no recent wt changes. Ht in chart (69 in) seems unlikely

## 2021-09-16 NOTE — DIETITIAN INITIAL EVALUATION ADULT. - ADD RECOMMEND
1. Spoke with daughter that when diet resumes, smoothies with frozen fruit can provide fiber to help with GI motility.

## 2021-09-16 NOTE — DIETITIAN INITIAL EVALUATION ADULT. - PERTINENT LABORATORY DATA
09-16 Na 136 mmol/L Glu 115 mg/dL<H> K+ 4.0 mmol/L Cr 1.36 mg/dL<H> BUN 25 mg/dL<H> Phos 5.1 mg/dL<H>  09-16 @ 12:02 POCT 139 mg/dL  09-16 @ 05:51 POCT 109 mg/dL  09-16 @ 00:40 POCT 133 mg/dL  09-15 @ 17:03 POCT 130 mg/dL

## 2021-09-17 LAB
ANION GAP SERPL CALC-SCNC: 8 MMOL/L — SIGNIFICANT CHANGE UP (ref 7–14)
BASOPHILS # BLD AUTO: 0.03 K/UL — SIGNIFICANT CHANGE UP (ref 0–0.2)
BASOPHILS NFR BLD AUTO: 0.4 % — SIGNIFICANT CHANGE UP (ref 0–2)
BUN SERPL-MCNC: 19 MG/DL — SIGNIFICANT CHANGE UP (ref 7–23)
CALCIUM SERPL-MCNC: 8.1 MG/DL — LOW (ref 8.4–10.5)
CHLORIDE SERPL-SCNC: 97 MMOL/L — LOW (ref 98–107)
CO2 SERPL-SCNC: 30 MMOL/L — SIGNIFICANT CHANGE UP (ref 22–31)
CREAT SERPL-MCNC: 0.91 MG/DL — SIGNIFICANT CHANGE UP (ref 0.5–1.3)
EOSINOPHIL # BLD AUTO: 0.08 K/UL — SIGNIFICANT CHANGE UP (ref 0–0.5)
EOSINOPHIL NFR BLD AUTO: 1 % — SIGNIFICANT CHANGE UP (ref 0–6)
GLUCOSE SERPL-MCNC: 140 MG/DL — HIGH (ref 70–99)
GRAM STN FLD: SIGNIFICANT CHANGE UP
HCT VFR BLD CALC: 32.5 % — LOW (ref 34.5–45)
HGB BLD-MCNC: 10.5 G/DL — LOW (ref 11.5–15.5)
IANC: 5.42 K/UL — SIGNIFICANT CHANGE UP (ref 1.5–8.5)
IMM GRANULOCYTES NFR BLD AUTO: 0.5 % — SIGNIFICANT CHANGE UP (ref 0–1.5)
LYMPHOCYTES # BLD AUTO: 1.61 K/UL — SIGNIFICANT CHANGE UP (ref 1–3.3)
LYMPHOCYTES # BLD AUTO: 20 % — SIGNIFICANT CHANGE UP (ref 13–44)
MAGNESIUM SERPL-MCNC: 2.4 MG/DL — SIGNIFICANT CHANGE UP (ref 1.6–2.6)
MCHC RBC-ENTMCNC: 29 PG — SIGNIFICANT CHANGE UP (ref 27–34)
MCHC RBC-ENTMCNC: 32.3 GM/DL — SIGNIFICANT CHANGE UP (ref 32–36)
MCV RBC AUTO: 89.8 FL — SIGNIFICANT CHANGE UP (ref 80–100)
MONOCYTES # BLD AUTO: 0.85 K/UL — SIGNIFICANT CHANGE UP (ref 0–0.9)
MONOCYTES NFR BLD AUTO: 10.6 % — SIGNIFICANT CHANGE UP (ref 2–14)
NEUTROPHILS # BLD AUTO: 5.42 K/UL — SIGNIFICANT CHANGE UP (ref 1.8–7.4)
NEUTROPHILS NFR BLD AUTO: 67.5 % — SIGNIFICANT CHANGE UP (ref 43–77)
NRBC # BLD: 0 /100 WBCS — SIGNIFICANT CHANGE UP
NRBC # FLD: 0 K/UL — SIGNIFICANT CHANGE UP
PHOSPHATE SERPL-MCNC: 2.8 MG/DL — SIGNIFICANT CHANGE UP (ref 2.5–4.5)
PLATELET # BLD AUTO: 105 K/UL — LOW (ref 150–400)
POTASSIUM SERPL-MCNC: 4.1 MMOL/L — SIGNIFICANT CHANGE UP (ref 3.5–5.3)
POTASSIUM SERPL-SCNC: 4.1 MMOL/L — SIGNIFICANT CHANGE UP (ref 3.5–5.3)
RBC # BLD: 3.62 M/UL — LOW (ref 3.8–5.2)
RBC # FLD: 14.4 % — SIGNIFICANT CHANGE UP (ref 10.3–14.5)
SODIUM SERPL-SCNC: 135 MMOL/L — SIGNIFICANT CHANGE UP (ref 135–145)
SPECIMEN SOURCE: SIGNIFICANT CHANGE UP
WBC # BLD: 8.03 K/UL — SIGNIFICANT CHANGE UP (ref 3.8–10.5)
WBC # FLD AUTO: 8.03 K/UL — SIGNIFICANT CHANGE UP (ref 3.8–10.5)

## 2021-09-17 PROCEDURE — 71045 X-RAY EXAM CHEST 1 VIEW: CPT | Mod: 26

## 2021-09-17 PROCEDURE — 99233 SBSQ HOSP IP/OBS HIGH 50: CPT

## 2021-09-17 RX ORDER — ACETAMINOPHEN 500 MG
1000 TABLET ORAL ONCE
Refills: 0 | Status: COMPLETED | OUTPATIENT
Start: 2021-09-17 | End: 2021-09-17

## 2021-09-17 RX ORDER — ALBUTEROL 90 UG/1
2 AEROSOL, METERED ORAL EVERY 6 HOURS
Refills: 0 | Status: DISCONTINUED | OUTPATIENT
Start: 2021-09-17 | End: 2021-09-20

## 2021-09-17 RX ADMIN — PIPERACILLIN AND TAZOBACTAM 25 GRAM(S): 4; .5 INJECTION, POWDER, LYOPHILIZED, FOR SOLUTION INTRAVENOUS at 22:34

## 2021-09-17 RX ADMIN — DORNASE ALFA 2.5 MILLIGRAM(S): 1 SOLUTION RESPIRATORY (INHALATION) at 08:23

## 2021-09-17 RX ADMIN — LIDOCAINE 1 PATCH: 4 CREAM TOPICAL at 00:10

## 2021-09-17 RX ADMIN — Medication 250 MILLIGRAM(S): at 22:34

## 2021-09-17 RX ADMIN — Medication 12.5 MILLIGRAM(S): at 06:22

## 2021-09-17 RX ADMIN — ALBUTEROL 2.5 MILLIGRAM(S): 90 AEROSOL, METERED ORAL at 08:11

## 2021-09-17 RX ADMIN — ATORVASTATIN CALCIUM 10 MILLIGRAM(S): 80 TABLET, FILM COATED ORAL at 22:49

## 2021-09-17 RX ADMIN — SODIUM CHLORIDE 4 MILLILITER(S): 9 INJECTION INTRAMUSCULAR; INTRAVENOUS; SUBCUTANEOUS at 15:47

## 2021-09-17 RX ADMIN — SODIUM CHLORIDE 4 MILLILITER(S): 9 INJECTION INTRAMUSCULAR; INTRAVENOUS; SUBCUTANEOUS at 08:11

## 2021-09-17 RX ADMIN — Medication 650 MILLIGRAM(S): at 00:04

## 2021-09-17 RX ADMIN — Medication 650 MILLIGRAM(S): at 00:34

## 2021-09-17 RX ADMIN — LIDOCAINE 1 PATCH: 4 CREAM TOPICAL at 20:05

## 2021-09-17 RX ADMIN — Medication 400 MILLIGRAM(S): at 09:05

## 2021-09-17 RX ADMIN — Medication 12.5 MILLIGRAM(S): at 18:35

## 2021-09-17 RX ADMIN — Medication 650 MILLIGRAM(S): at 06:22

## 2021-09-17 RX ADMIN — PIPERACILLIN AND TAZOBACTAM 25 GRAM(S): 4; .5 INJECTION, POWDER, LYOPHILIZED, FOR SOLUTION INTRAVENOUS at 13:20

## 2021-09-17 RX ADMIN — HEPARIN SODIUM 5000 UNIT(S): 5000 INJECTION INTRAVENOUS; SUBCUTANEOUS at 13:30

## 2021-09-17 RX ADMIN — HEPARIN SODIUM 5000 UNIT(S): 5000 INJECTION INTRAVENOUS; SUBCUTANEOUS at 06:21

## 2021-09-17 RX ADMIN — Medication 1000 MILLIGRAM(S): at 19:02

## 2021-09-17 RX ADMIN — LIDOCAINE 1 PATCH: 4 CREAM TOPICAL at 23:30

## 2021-09-17 RX ADMIN — Medication 1000 MILLIGRAM(S): at 09:39

## 2021-09-17 RX ADMIN — SODIUM CHLORIDE 4 MILLILITER(S): 9 INJECTION INTRAMUSCULAR; INTRAVENOUS; SUBCUTANEOUS at 03:59

## 2021-09-17 RX ADMIN — Medication 50 MILLIGRAM(S): at 11:33

## 2021-09-17 RX ADMIN — ALBUTEROL 2.5 MILLIGRAM(S): 90 AEROSOL, METERED ORAL at 03:59

## 2021-09-17 RX ADMIN — PANTOPRAZOLE SODIUM 40 MILLIGRAM(S): 20 TABLET, DELAYED RELEASE ORAL at 06:22

## 2021-09-17 RX ADMIN — Medication 150 MICROGRAM(S): at 06:22

## 2021-09-17 RX ADMIN — HEPARIN SODIUM 5000 UNIT(S): 5000 INJECTION INTRAVENOUS; SUBCUTANEOUS at 22:35

## 2021-09-17 RX ADMIN — PIPERACILLIN AND TAZOBACTAM 25 GRAM(S): 4; .5 INJECTION, POWDER, LYOPHILIZED, FOR SOLUTION INTRAVENOUS at 06:21

## 2021-09-17 RX ADMIN — Medication 400 MILLIGRAM(S): at 18:30

## 2021-09-17 RX ADMIN — LIDOCAINE 1 PATCH: 4 CREAM TOPICAL at 11:33

## 2021-09-17 RX ADMIN — ALBUTEROL 2.5 MILLIGRAM(S): 90 AEROSOL, METERED ORAL at 15:44

## 2021-09-17 RX ADMIN — SENNA PLUS 2 TABLET(S): 8.6 TABLET ORAL at 22:35

## 2021-09-17 NOTE — PROGRESS NOTE ADULT - SUBJECTIVE AND OBJECTIVE BOX
ANAND BREEN      81y   Female   [id number]         No Known Allergies             Daily     Daily Drug Dosing Weight  Height (cm): 175.3 (14 Sep 2021 13:57)  Weight (kg): 75.2 (14 Sep 2021 13:57)  BMI (kg/m2): 24.5 (14 Sep 2021 13:57)  BSA (m2): 1.91 (14 Sep 2021 13:57)    81 y.o. female with h/o HTN, HLD, DM, hypothyroidism, presents to [facility] with preop diagnosis of solitary pulmonary nodule, c/o dyspnea on exertion, denies cough, hemoptysis, scheduled for robotic assisted right video assisted thoracoscopy, right lower lobe wedge resection, mediastinal lymph node dissection (10 Sep 2021 14:31).     Procedure:  Robotic RVATS, RUL wedgex2, RLL wedgex1, Pneumonolysis  14-Sep-2021                     Issues:              Lung nodule              Postop pain              Chest tube in place  HTN  HLD  DM2  Hypothyroidism  Depression                Home Medications:  atorvastatin 10 mg oral tablet: 1 tab(s) orally once a day am (14 Sep 2021 11:22)  Iron 100 Plus: 1  orally once a day (14 Sep 2021 06:46)  Lyrica 50 mg oral capsule: 1 cap(s) orally once a day (14 Sep 2021 06:46)  meloxicam 15 mg oral tablet: 1 tab(s) orally once a day, last dose 9/10/21 (14 Sep 2021 06:46)  metFORMIN 500 mg oral tablet: 1 tab(s) orally once a day am (14 Sep 2021 06:46)  metoprolol succinate 25 mg oral tablet, extended release: 1 tab(s) orally once a day (at bedtime) (14 Sep 2021 06:46)  Myrbetriq 25 mg oral tablet, extended release: 1 tab(s) orally once a day am (14 Sep 2021 06:46)  pantoprazole 40 mg oral delayed release tablet: 1 tab(s) orally once a day (14 Sep 2021 06:46)  raNITIdine 150 mg oral capsule: 1 cap(s) orally once a day pm (14 Sep 2021 06:46)  Synthroid 150 mcg (0.15 mg) oral tablet: 1 tab(s) orally once a day am (14 Sep 2021 06:46)  valsartan-hydrochlorothiazide 160mg-25mg oral tablet: 1 tab(s) orally once a day noon (14 Sep 2021 06:46)  Vitamin B12 1000 mcg oral tablet: 1 tab(s) orally once a day (14 Sep 2021 06:46)    PAST MEDICAL & SURGICAL HISTORY:  HTN (hypertension)    HLD (hyperlipidemia)    DM (diabetes mellitus)    Hypothyroidism    Solitary pulmonary nodule    Depression    History of appendectomy    S/P cholecystectomy    H/O total knee replacement  b/l knee    H/O arthroscopy  left shoulder      Vital Signs Last 24 Hrs  T(C): 37.1 (17 Sep 2021 08:00), Max: 38.6 (16 Sep 2021 20:00)  T(F): 98.7 (17 Sep 2021 08:00), Max: 101.4 (16 Sep 2021 20:00)  HR: 70 (17 Sep 2021 10:00) (69 - 94)  BP: 107/59 (17 Sep 2021 10:00) (104/42 - 155/51)  BP(mean): 72 (17 Sep 2021 10:00) (47 - 79)  RR: 19 (17 Sep 2021 10:00) (9 - 27)  SpO2: 99% (17 Sep 2021 10:00) (94% - 100%)  I&O's Detail    16 Sep 2021 07:01  -  17 Sep 2021 07:00  --------------------------------------------------------  IN:    Albumin 5%  - 250 mL: 500 mL    IV PiggyBack: 450 mL    Lactated Ringers: 30 mL    Oral Fluid: 700 mL  Total IN: 1680 mL    OUT:    Chest Tube (mL): 65 mL    Indwelling Catheter - Urethral (mL): 1073 mL    Voided (mL): 200 mL  Total OUT: 1338 mL    Total NET: 342 mL      17 Sep 2021 07:01  -  17 Sep 2021 11:25  --------------------------------------------------------  IN:    IV PiggyBack: 100 mL    Oral Fluid: 100 mL  Total IN: 200 mL    OUT:    Indwelling Catheter - Urethral (mL): 120 mL  Total OUT: 120 mL    Total NET: 80 mL        CAPILLARY BLOOD GLUCOSE      POCT Blood Glucose.: 129 mg/dL (17 Sep 2021 11:06)  POCT Blood Glucose.: 131 mg/dL (16 Sep 2021 22:21)  POCT Blood Glucose.: 106 mg/dL (16 Sep 2021 15:40)  POCT Blood Glucose.: 139 mg/dL (16 Sep 2021 12:02)    Home Medications:  atorvastatin 10 mg oral tablet: 1 tab(s) orally once a day am (14 Sep 2021 11:22)  Iron 100 Plus: 1  orally once a day (14 Sep 2021 06:46)  Lyrica 50 mg oral capsule: 1 cap(s) orally once a day (14 Sep 2021 06:46)  meloxicam 15 mg oral tablet: 1 tab(s) orally once a day, last dose 9/10/21 (14 Sep 2021 06:46)  metFORMIN 500 mg oral tablet: 1 tab(s) orally once a day am (14 Sep 2021 06:46)  metoprolol succinate 25 mg oral tablet, extended release: 1 tab(s) orally once a day (at bedtime) (14 Sep 2021 06:46)  Myrbetriq 25 mg oral tablet, extended release: 1 tab(s) orally once a day am (14 Sep 2021 06:46)  pantoprazole 40 mg oral delayed release tablet: 1 tab(s) orally once a day (14 Sep 2021 06:46)  raNITIdine 150 mg oral capsule: 1 cap(s) orally once a day pm (14 Sep 2021 06:46)  Synthroid 150 mcg (0.15 mg) oral tablet: 1 tab(s) orally once a day am (14 Sep 2021 06:46)  valsartan-hydrochlorothiazide 160mg-25mg oral tablet: 1 tab(s) orally once a day noon (14 Sep 2021 06:46)  Vitamin B12 1000 mcg oral tablet: 1 tab(s) orally once a day (14 Sep 2021 06:46)    MEDICATIONS  (STANDING):  acetaminophen  IVPB .. 1000 milliGRAM(s) IV Intermittent once  ALBUTerol    0.083% 2.5 milliGRAM(s) Nebulizer every 6 hours  atorvastatin 10 milliGRAM(s) Oral at bedtime  dextrose 50% Injectable 25 Gram(s) IV Push once  dornase bruna Solution 2.5 milliGRAM(s) Inhalation every 12 hours  heparin   Injectable 5000 Unit(s) SubCutaneous every 8 hours  insulin lispro (ADMELOG) corrective regimen sliding scale   SubCutaneous three times a day before meals  levothyroxine 150 MICROGram(s) Oral daily  lidocaine   4% Patch 1 Patch Transdermal every 24 hours  metoprolol tartrate 12.5 milliGRAM(s) Oral every 12 hours  pantoprazole    Tablet 40 milliGRAM(s) Oral before breakfast  piperacillin/tazobactam IVPB.. 3.375 Gram(s) IV Intermittent every 8 hours  pregabalin 50 milliGRAM(s) Oral daily  senna 2 Tablet(s) Oral at bedtime  sodium chloride 3%  Inhalation 4 milliLiter(s) Inhalation every 6 hours  vancomycin  IVPB 1000 milliGRAM(s) IV Intermittent every 24 hours    MEDICATIONS  (PRN):  bisacodyl Suppository 10 milliGRAM(s) Rectal daily PRN Constipation  naloxone Injectable 0.1 milliGRAM(s) IV Push every 3 minutes PRN For ANY of the following changes in patient status:  A. RR LESS THAN 10 breaths per minute, B. Oxygen saturation LESS THAN 90%, C. Sedation score of 6  ondansetron Injectable 4 milliGRAM(s) IV Push every 6 hours PRN Nausea        Physical exam:           General:               Pt is awake, alert,  appears to be in pain but not in  distress                                                  Neuro:                  Nonfocal                             Cardiovascular:   S1 & S2, regular / irregular                          Respiratory:         Air entry is fair and equal on both sides, has bilateral conducted sounds                           GI:                          Soft, nondistended and nontender, Bowel sounds active                            Ext:                        No cyanosis or edema                              Labs:                                                                           10.5   8.03  )-----------( 105      ( 17 Sep 2021 05:07 )             32.5             09-17    135  |  97<L>  |  19  ----------------------------<  140<H>  4.1   |  30  |  0.91    Ca    8.1<L>      17 Sep 2021 05:07  Phos  2.8       Mg     2.40                           Urinalysis Basic - ( 16 Sep 2021 21:05 )    Color: Yellow / Appearance: Clear / S.022 / pH: x  Gluc: x / Ketone: Negative  / Bili: Negative / Urobili: <2 mg/dL   Blood: x / Protein: Trace / Nitrite: Negative   Leuk Esterase: Negative / RBC: 3 /HPF / WBC 6 /HPF   Sq Epi: x / Non Sq Epi: 1 /HPF / Bacteria: Negative        Culture - Acid Fast - Tissue w/Smear (collected 14 Sep 2021 15:04)  Source: .Tissue RIGHT LOWER LOBE    Culture - Fungal, Tissue (collected 14 Sep 2021 15:04)  Source: .Tissue RIGHT LOWER LOBE  Preliminary Report (15 Sep 2021 06:22):    Testing in progress    Culture - Tissue with Gram Stain (collected 14 Sep 2021 15:04)  Source: .Tissue RIGHT LOWER LOBE  Gram Stain (14 Sep 2021 22:03):    No polymorphonuclear leukocytes seen per low power field    No organisms seen per oil power field  Preliminary Report (15 Sep 2021 11:38):    No growth    Culture - Acid Fast - Tissue w/Smear (collected 14 Sep 2021 15:02)  Source: .Tissue RIGHT UPPER LOBE    Culture - Fungal, Tissue (collected 14 Sep 2021 15:02)  Source: .Tissue RIGHT UPPER LOBE  Preliminary Report (15 Sep 2021 06:47):    Testing in progress    Culture - Tissue with Gram Stain (collected 14 Sep 2021 15:02)  Source: .Tissue RIGHT UPPER LOBE  Gram Stain (14 Sep 2021 22:08):    No polymorphonuclear leukocytes seen per low power field    No organisms seen per oil power field  Preliminary Report (15 Sep 2021 11:39):    No growth      CXR:    Plan:  General: 81yFemale s/p Robotic RVATS, RUL wedgex2, RLL wedgex1, Pneumonolysis  14-Sep-2021 , experiencing  pain with deep breathing.                             Neuro:                                         Pain control with   Oxy /  Tylenol PRN                            Cardiovascular:                                          HTN: Continue hemodynamic monitoring and restart Low dose Lopressor.     HLD: On Lipitor                            Respiratory:                                         Pt is on 2L  nasal canula, wean off as tolerated.                                           Comfortable, not in any distress.                                         Encourage incentive spirometry. Start 3% saline inhalations                                          Monitor chest tube output                                         Chest tube to  water seal, check CXR                                                                   Continue bronchodilators, pulmonary toilet                            GI                                         On  DASH  diet as tolerated                                         Continue Zofran / Reglan for nausea - PRN	                                                                 Renal:                                         Continue LR  30cc/hr                                         Monitor I/Os and electrolytes                                                                                        Hem/ Onc:                                                                                  Monitor chest tube output &  signs of bleeding.                                          Follow CBC in AM                           Infectious disease:                                            Monitor for fever / leukocytosis.                                          All surgical incision / chest tube  sites look clean                            Endocrine                                             DM-2 / Hyperglycemia: Continue Accu-Checks with coverage  Hold oral meds    Pt is on SQ Heparin and Venodyne boots for DVT prophylaxis.     Pertinent clinical, laboratory, radiographic, hemodynamic, echocardiographic, respiratory data, microbiologic data and chart were reviewed and analyzed frequently throughout the course of the day and night  Patient seen, examined and plan discussed with CT Surgeon .  / CTICU team during rounds.    OOB to chair and ambulate as tolerated.     Status discussed with patient /  updated plan of care    I have spent     minutes of critical care time with this pt between  7am and 11.59pm monitoring hemodynamic status, managing fluid resuscitation /  pressors to prevent / worsening of shock and ventilator management.           Kendrick Seals MD                                                                     ANAND BREEN      81y   Female   [id number]         No Known Allergies             Daily     Daily Drug Dosing Weight  Height (cm): 175.3 (14 Sep 2021 13:57)  Weight (kg): 75.2 (14 Sep 2021 13:57)  BMI (kg/m2): 24.5 (14 Sep 2021 13:57)  BSA (m2): 1.91 (14 Sep 2021 13:57)    81 y.o. female with h/o HTN, HLD, DM, hypothyroidism, presents to [facility] with preop diagnosis of solitary pulmonary nodule, c/o dyspnea on exertion, denies cough, hemoptysis, scheduled for robotic assisted right video assisted thoracoscopy, right lower lobe wedge resection, mediastinal lymph node dissection (10 Sep 2021 14:31).     Procedure:  Robotic RVATS, RUL wedgex2, RLL wedgex1, Pneumonolysis  14-Sep-2021                     Issues:              Lung nodule              Postop pain              Chest tube in place  HTN  HLD  DM2  Hypothyroidism  Depression  Fever r/o Pneumonia                Home Medications:  atorvastatin 10 mg oral tablet: 1 tab(s) orally once a day am (14 Sep 2021 11:22)  Iron 100 Plus: 1  orally once a day (14 Sep 2021 06:46)  Lyrica 50 mg oral capsule: 1 cap(s) orally once a day (14 Sep 2021 06:46)  meloxicam 15 mg oral tablet: 1 tab(s) orally once a day, last dose 9/10/21 (14 Sep 2021 06:46)  metFORMIN 500 mg oral tablet: 1 tab(s) orally once a day am (14 Sep 2021 06:46)  metoprolol succinate 25 mg oral tablet, extended release: 1 tab(s) orally once a day (at bedtime) (14 Sep 2021 06:46)  Myrbetriq 25 mg oral tablet, extended release: 1 tab(s) orally once a day am (14 Sep 2021 06:46)  pantoprazole 40 mg oral delayed release tablet: 1 tab(s) orally once a day (14 Sep 2021 06:46)  raNITIdine 150 mg oral capsule: 1 cap(s) orally once a day pm (14 Sep 2021 06:46)  Synthroid 150 mcg (0.15 mg) oral tablet: 1 tab(s) orally once a day am (14 Sep 2021 06:46)  valsartan-hydrochlorothiazide 160mg-25mg oral tablet: 1 tab(s) orally once a day noon (14 Sep 2021 06:46)  Vitamin B12 1000 mcg oral tablet: 1 tab(s) orally once a day (14 Sep 2021 06:46)    PAST MEDICAL & SURGICAL HISTORY:  HTN (hypertension)    HLD (hyperlipidemia)    DM (diabetes mellitus)    Hypothyroidism    Solitary pulmonary nodule    Depression    History of appendectomy    S/P cholecystectomy    H/O total knee replacement  b/l knee    H/O arthroscopy  left shoulder      Vital Signs Last 24 Hrs  T(C): 37.1 (17 Sep 2021 08:00), Max: 38.6 (16 Sep 2021 20:00)  T(F): 98.7 (17 Sep 2021 08:00), Max: 101.4 (16 Sep 2021 20:00)  HR: 70 (17 Sep 2021 10:00) (69 - 94)  BP: 107/59 (17 Sep 2021 10:00) (104/42 - 155/51)  BP(mean): 72 (17 Sep 2021 10:00) (47 - 79)  RR: 19 (17 Sep 2021 10:00) (9 - 27)  SpO2: 99% (17 Sep 2021 10:00) (94% - 100%)  I&O's Detail    16 Sep 2021 07:01  -  17 Sep 2021 07:00  --------------------------------------------------------  IN:    Albumin 5%  - 250 mL: 500 mL    IV PiggyBack: 450 mL    Lactated Ringers: 30 mL    Oral Fluid: 700 mL  Total IN: 1680 mL    OUT:    Chest Tube (mL): 65 mL    Indwelling Catheter - Urethral (mL): 1073 mL    Voided (mL): 200 mL  Total OUT: 1338 mL    Total NET: 342 mL      17 Sep 2021 07:01  -  17 Sep 2021 11:25  --------------------------------------------------------  IN:    IV PiggyBack: 100 mL    Oral Fluid: 100 mL  Total IN: 200 mL    OUT:    Indwelling Catheter - Urethral (mL): 120 mL  Total OUT: 120 mL    Total NET: 80 mL        CAPILLARY BLOOD GLUCOSE      POCT Blood Glucose.: 129 mg/dL (17 Sep 2021 11:06)  POCT Blood Glucose.: 131 mg/dL (16 Sep 2021 22:21)  POCT Blood Glucose.: 106 mg/dL (16 Sep 2021 15:40)  POCT Blood Glucose.: 139 mg/dL (16 Sep 2021 12:02)    Home Medications:  atorvastatin 10 mg oral tablet: 1 tab(s) orally once a day am (14 Sep 2021 11:22)  Iron 100 Plus: 1  orally once a day (14 Sep 2021 06:46)  Lyrica 50 mg oral capsule: 1 cap(s) orally once a day (14 Sep 2021 06:46)  meloxicam 15 mg oral tablet: 1 tab(s) orally once a day, last dose 9/10/21 (14 Sep 2021 06:46)  metFORMIN 500 mg oral tablet: 1 tab(s) orally once a day am (14 Sep 2021 06:46)  metoprolol succinate 25 mg oral tablet, extended release: 1 tab(s) orally once a day (at bedtime) (14 Sep 2021 06:46)  Myrbetriq 25 mg oral tablet, extended release: 1 tab(s) orally once a day am (14 Sep 2021 06:46)  pantoprazole 40 mg oral delayed release tablet: 1 tab(s) orally once a day (14 Sep 2021 06:46)  raNITIdine 150 mg oral capsule: 1 cap(s) orally once a day pm (14 Sep 2021 06:46)  Synthroid 150 mcg (0.15 mg) oral tablet: 1 tab(s) orally once a day am (14 Sep 2021 06:46)  valsartan-hydrochlorothiazide 160mg-25mg oral tablet: 1 tab(s) orally once a day noon (14 Sep 2021 06:46)  Vitamin B12 1000 mcg oral tablet: 1 tab(s) orally once a day (14 Sep 2021 06:46)    MEDICATIONS  (STANDING):  acetaminophen  IVPB .. 1000 milliGRAM(s) IV Intermittent once  ALBUTerol    0.083% 2.5 milliGRAM(s) Nebulizer every 6 hours  atorvastatin 10 milliGRAM(s) Oral at bedtime  dextrose 50% Injectable 25 Gram(s) IV Push once  dornase bruna Solution 2.5 milliGRAM(s) Inhalation every 12 hours  heparin   Injectable 5000 Unit(s) SubCutaneous every 8 hours  insulin lispro (ADMELOG) corrective regimen sliding scale   SubCutaneous three times a day before meals  levothyroxine 150 MICROGram(s) Oral daily  lidocaine   4% Patch 1 Patch Transdermal every 24 hours  metoprolol tartrate 12.5 milliGRAM(s) Oral every 12 hours  pantoprazole    Tablet 40 milliGRAM(s) Oral before breakfast  piperacillin/tazobactam IVPB.. 3.375 Gram(s) IV Intermittent every 8 hours  pregabalin 50 milliGRAM(s) Oral daily  senna 2 Tablet(s) Oral at bedtime  sodium chloride 3%  Inhalation 4 milliLiter(s) Inhalation every 6 hours  vancomycin  IVPB 1000 milliGRAM(s) IV Intermittent every 24 hours    MEDICATIONS  (PRN):  bisacodyl Suppository 10 milliGRAM(s) Rectal daily PRN Constipation  naloxone Injectable 0.1 milliGRAM(s) IV Push every 3 minutes PRN For ANY of the following changes in patient status:  A. RR LESS THAN 10 breaths per minute, B. Oxygen saturation LESS THAN 90%, C. Sedation score of 6  ondansetron Injectable 4 milliGRAM(s) IV Push every 6 hours PRN Nausea        Physical exam:               General:               Pt is awake, alert,  appears to be in pain but not in  distress                                                  Neuro:                  Nonfocal                             Cardiovascular:   S1 & S2, regular                          Respiratory:         Air entry is fair and equal on both sides, has bilateral conducted sounds                           GI:                          Soft, nondistended and nontender, Bowel sounds active                            Ext:                        No cyanosis or edema                              Labs:                                                                           10.5   8.03  )-----------( 105      ( 17 Sep 2021 05:07 )             32.5             -    135  |  97<L>  |  19  ----------------------------<  140<H>  4.1   |  30  |  0.91    Ca    8.1<L>      17 Sep 2021 05:07  Phos  2.8       Mg     2.40                           Urinalysis Basic - ( 16 Sep 2021 21:05 )    Color: Yellow / Appearance: Clear / S.022 / pH: x  Gluc: x / Ketone: Negative  / Bili: Negative / Urobili: <2 mg/dL   Blood: x / Protein: Trace / Nitrite: Negative   Leuk Esterase: Negative / RBC: 3 /HPF / WBC 6 /HPF   Sq Epi: x / Non Sq Epi: 1 /HPF / Bacteria: Negative        Culture - Acid Fast - Tissue w/Smear (collected 14 Sep 2021 15:04)  Source: .Tissue RIGHT LOWER LOBE    Culture - Fungal, Tissue (collected 14 Sep 2021 15:04)  Source: .Tissue RIGHT LOWER LOBE  Preliminary Report (15 Sep 2021 06:22):    Testing in progress    Culture - Tissue with Gram Stain (collected 14 Sep 2021 15:04)  Source: .Tissue RIGHT LOWER LOBE  Gram Stain (14 Sep 2021 22:03):    No polymorphonuclear leukocytes seen per low power field    No organisms seen per oil power field  Preliminary Report (15 Sep 2021 11:38):    No growth    Culture - Acid Fast - Tissue w/Smear (collected 14 Sep 2021 15:02)  Source: .Tissue RIGHT UPPER LOBE    Culture - Fungal, Tissue (collected 14 Sep 2021 15:02)  Source: .Tissue RIGHT UPPER LOBE  Preliminary Report (15 Sep 2021 06:47):    Testing in progress    Culture - Tissue with Gram Stain (collected 14 Sep 2021 15:02)  Source: .Tissue RIGHT UPPER LOBE  Gram Stain (14 Sep 2021 22:08):    No polymorphonuclear leukocytes seen per low power field    No organisms seen per oil power field  Preliminary Report (15 Sep 2021 11:39):    No growth      CXR:    Plan:  General: 81yFemale s/p Robotic RVATS, RUL wedgex2, RLL wedgex1, Pneumonolysis  14-Sep-2021 , experiencing  pain with deep breathing.                             Neuro:                                         Pain control with  Tylenol PRN. Avoid Narcotics                            Cardiovascular:                                          HTN: Continue hemodynamic monitoring and restarted Low dose Lopressor.     HLD: On Lipitor                            Respiratory:                                         Pt is on 2L  nasal canula, wean off as tolerated.       Febrile yesterday - Cultured and started on antibiotics.                                          Comfortable, not in any distress.                                         Encourage incentive spirometry. Start 3% saline inhalations.                                           Chest tube D/CD                                                                 Continue bronchodilators, pulmonary toilet                            GI                                         On  DASH  diet as tolerated.                                          Continue Zofran / Reglan for nausea - PRN	                                                                 Renal:                                         Continue LR  30cc/hr                                         Monitor I/Os and electrolytes                                                                                        Hem/ Onc:                                                                                  Monitor chest tube output &  signs of bleeding.                                          Follow CBC in AM                           Infectious disease:       Febrile yesterday - Cultured and started on antibiotics for presumed pneumonia.  Monitor Vanco level                                          Monitor for fever / leukocytosis.                                          All surgical incision / chest tube  sites look clean                            Endocrine                                             DM-2 / Hyperglycemia: Continue Accu-Checks with coverage.   Hold oral meds    Pt is on SQ Heparin and Venodyne boots for DVT prophylaxis.     Pertinent clinical, laboratory, radiographic, hemodynamic, echocardiographic, respiratory data, microbiologic data and chart were reviewed and analyzed frequently throughout the course of the day and night  Patient seen, examined and plan discussed with CT Surgeon Dr. Magdaleno  / CTICU team during rounds.    OOB to chair and ambulate as tolerated.     Status discussed with patient /  updated plan of care    I have spent     minutes of critical care time with this pt between  7am and 11.59pm monitoring hemodynamic status, managing fluid resuscitation /  pressors to prevent / worsening of shock and ventilator management.           Kendrick Seals MD

## 2021-09-17 NOTE — CHART NOTE - NSCHARTNOTEFT_GEN_A_CORE
I was advised by Director of Infection Control that the patient had exposure on 9/17 to someone who had a positive covid surveillance test today. The person is asymptomatic. The patient was not wearing a mask. Pt and daughter (who translated) understand that out of an abundance of caution we are placing them on droplet precaution, advised to quarantine for 14 days, symptom monitor, and get a covid test in 8-10 days from date of exposure. Pt was advised that they must wear a procedural mask while in the hospital

## 2021-09-18 LAB
ANION GAP SERPL CALC-SCNC: 9 MMOL/L — SIGNIFICANT CHANGE UP (ref 7–14)
APPEARANCE UR: CLEAR — SIGNIFICANT CHANGE UP
BACTERIA # UR AUTO: NEGATIVE — SIGNIFICANT CHANGE UP
BASOPHILS # BLD AUTO: 0.04 K/UL — SIGNIFICANT CHANGE UP (ref 0–0.2)
BASOPHILS NFR BLD AUTO: 0.5 % — SIGNIFICANT CHANGE UP (ref 0–2)
BILIRUB UR-MCNC: NEGATIVE — SIGNIFICANT CHANGE UP
BUN SERPL-MCNC: 13 MG/DL — SIGNIFICANT CHANGE UP (ref 7–23)
CALCIUM SERPL-MCNC: 8.5 MG/DL — SIGNIFICANT CHANGE UP (ref 8.4–10.5)
CHLORIDE SERPL-SCNC: 97 MMOL/L — LOW (ref 98–107)
CO2 SERPL-SCNC: 29 MMOL/L — SIGNIFICANT CHANGE UP (ref 22–31)
COLOR SPEC: YELLOW — SIGNIFICANT CHANGE UP
CREAT SERPL-MCNC: 0.6 MG/DL — SIGNIFICANT CHANGE UP (ref 0.5–1.3)
DIFF PNL FLD: ABNORMAL
EOSINOPHIL # BLD AUTO: 0.3 K/UL — SIGNIFICANT CHANGE UP (ref 0–0.5)
EOSINOPHIL NFR BLD AUTO: 3.8 % — SIGNIFICANT CHANGE UP (ref 0–6)
EPI CELLS # UR: 1 /HPF — SIGNIFICANT CHANGE UP (ref 0–5)
GLUCOSE SERPL-MCNC: 122 MG/DL — HIGH (ref 70–99)
GLUCOSE UR QL: NEGATIVE — SIGNIFICANT CHANGE UP
GRAM STN FLD: SIGNIFICANT CHANGE UP
HCT VFR BLD CALC: 32.4 % — LOW (ref 34.5–45)
HGB BLD-MCNC: 10.4 G/DL — LOW (ref 11.5–15.5)
HYALINE CASTS # UR AUTO: 0 /LPF — SIGNIFICANT CHANGE UP (ref 0–7)
IANC: 5.3 K/UL — SIGNIFICANT CHANGE UP (ref 1.5–8.5)
IMM GRANULOCYTES NFR BLD AUTO: 0.4 % — SIGNIFICANT CHANGE UP (ref 0–1.5)
KETONES UR-MCNC: ABNORMAL
LEUKOCYTE ESTERASE UR-ACNC: NEGATIVE — SIGNIFICANT CHANGE UP
LYMPHOCYTES # BLD AUTO: 1.33 K/UL — SIGNIFICANT CHANGE UP (ref 1–3.3)
LYMPHOCYTES # BLD AUTO: 16.8 % — SIGNIFICANT CHANGE UP (ref 13–44)
MAGNESIUM SERPL-MCNC: 2.2 MG/DL — SIGNIFICANT CHANGE UP (ref 1.6–2.6)
MCHC RBC-ENTMCNC: 28.7 PG — SIGNIFICANT CHANGE UP (ref 27–34)
MCHC RBC-ENTMCNC: 32.1 GM/DL — SIGNIFICANT CHANGE UP (ref 32–36)
MCV RBC AUTO: 89.3 FL — SIGNIFICANT CHANGE UP (ref 80–100)
MONOCYTES # BLD AUTO: 0.9 K/UL — SIGNIFICANT CHANGE UP (ref 0–0.9)
MONOCYTES NFR BLD AUTO: 11.4 % — SIGNIFICANT CHANGE UP (ref 2–14)
NEUTROPHILS # BLD AUTO: 5.3 K/UL — SIGNIFICANT CHANGE UP (ref 1.8–7.4)
NEUTROPHILS NFR BLD AUTO: 67.1 % — SIGNIFICANT CHANGE UP (ref 43–77)
NITRITE UR-MCNC: NEGATIVE — SIGNIFICANT CHANGE UP
NRBC # BLD: 0 /100 WBCS — SIGNIFICANT CHANGE UP
NRBC # FLD: 0 K/UL — SIGNIFICANT CHANGE UP
PH UR: 6 — SIGNIFICANT CHANGE UP (ref 5–8)
PHOSPHATE SERPL-MCNC: 2 MG/DL — LOW (ref 2.5–4.5)
PLATELET # BLD AUTO: 112 K/UL — LOW (ref 150–400)
POTASSIUM SERPL-MCNC: 4.6 MMOL/L — SIGNIFICANT CHANGE UP (ref 3.5–5.3)
POTASSIUM SERPL-SCNC: 4.6 MMOL/L — SIGNIFICANT CHANGE UP (ref 3.5–5.3)
PROT UR-MCNC: ABNORMAL
RBC # BLD: 3.63 M/UL — LOW (ref 3.8–5.2)
RBC # FLD: 14.4 % — SIGNIFICANT CHANGE UP (ref 10.3–14.5)
RBC CASTS # UR COMP ASSIST: 44 /HPF — HIGH (ref 0–4)
SODIUM SERPL-SCNC: 135 MMOL/L — SIGNIFICANT CHANGE UP (ref 135–145)
SP GR SPEC: 1.03 — SIGNIFICANT CHANGE UP (ref 1–1.05)
SPECIMEN SOURCE: SIGNIFICANT CHANGE UP
UROBILINOGEN FLD QL: SIGNIFICANT CHANGE UP
WBC # BLD: 7.9 K/UL — SIGNIFICANT CHANGE UP (ref 3.8–10.5)
WBC # FLD AUTO: 7.9 K/UL — SIGNIFICANT CHANGE UP (ref 3.8–10.5)
WBC UR QL: 2 /HPF — SIGNIFICANT CHANGE UP (ref 0–5)

## 2021-09-18 PROCEDURE — 99233 SBSQ HOSP IP/OBS HIGH 50: CPT

## 2021-09-18 PROCEDURE — 71045 X-RAY EXAM CHEST 1 VIEW: CPT | Mod: 26

## 2021-09-18 RX ORDER — INFLUENZA VIRUS VACCINE 15; 15; 15; 15 UG/.5ML; UG/.5ML; UG/.5ML; UG/.5ML
0.5 SUSPENSION INTRAMUSCULAR ONCE
Refills: 0 | Status: DISCONTINUED | OUTPATIENT
Start: 2021-09-18 | End: 2021-09-18

## 2021-09-18 RX ORDER — INFLUENZA VIRUS VACCINE 15; 15; 15; 15 UG/.5ML; UG/.5ML; UG/.5ML; UG/.5ML
0.7 SUSPENSION INTRAMUSCULAR ONCE
Refills: 0 | Status: DISCONTINUED | OUTPATIENT
Start: 2021-09-18 | End: 2021-09-20

## 2021-09-18 RX ORDER — METOPROLOL TARTRATE 50 MG
25 TABLET ORAL
Refills: 0 | Status: DISCONTINUED | OUTPATIENT
Start: 2021-09-18 | End: 2021-09-20

## 2021-09-18 RX ORDER — METOPROLOL TARTRATE 50 MG
25 TABLET ORAL EVERY 12 HOURS
Refills: 0 | Status: DISCONTINUED | OUTPATIENT
Start: 2021-09-18 | End: 2021-09-18

## 2021-09-18 RX ORDER — ACETAMINOPHEN 500 MG
650 TABLET ORAL EVERY 6 HOURS
Refills: 0 | Status: DISCONTINUED | OUTPATIENT
Start: 2021-09-18 | End: 2021-09-20

## 2021-09-18 RX ADMIN — Medication 150 MICROGRAM(S): at 05:05

## 2021-09-18 RX ADMIN — HEPARIN SODIUM 5000 UNIT(S): 5000 INJECTION INTRAVENOUS; SUBCUTANEOUS at 05:06

## 2021-09-18 RX ADMIN — Medication 650 MILLIGRAM(S): at 13:55

## 2021-09-18 RX ADMIN — Medication 650 MILLIGRAM(S): at 14:10

## 2021-09-18 RX ADMIN — ATORVASTATIN CALCIUM 10 MILLIGRAM(S): 80 TABLET, FILM COATED ORAL at 22:57

## 2021-09-18 RX ADMIN — HEPARIN SODIUM 5000 UNIT(S): 5000 INJECTION INTRAVENOUS; SUBCUTANEOUS at 13:22

## 2021-09-18 RX ADMIN — PIPERACILLIN AND TAZOBACTAM 25 GRAM(S): 4; .5 INJECTION, POWDER, LYOPHILIZED, FOR SOLUTION INTRAVENOUS at 22:58

## 2021-09-18 RX ADMIN — PIPERACILLIN AND TAZOBACTAM 25 GRAM(S): 4; .5 INJECTION, POWDER, LYOPHILIZED, FOR SOLUTION INTRAVENOUS at 13:21

## 2021-09-18 RX ADMIN — PIPERACILLIN AND TAZOBACTAM 25 GRAM(S): 4; .5 INJECTION, POWDER, LYOPHILIZED, FOR SOLUTION INTRAVENOUS at 05:04

## 2021-09-18 RX ADMIN — PANTOPRAZOLE SODIUM 40 MILLIGRAM(S): 20 TABLET, DELAYED RELEASE ORAL at 05:05

## 2021-09-18 RX ADMIN — Medication 50 MILLIGRAM(S): at 13:22

## 2021-09-18 RX ADMIN — Medication 12.5 MILLIGRAM(S): at 05:05

## 2021-09-18 RX ADMIN — SENNA PLUS 2 TABLET(S): 8.6 TABLET ORAL at 22:59

## 2021-09-18 RX ADMIN — LIDOCAINE 1 PATCH: 4 CREAM TOPICAL at 22:58

## 2021-09-18 RX ADMIN — HEPARIN SODIUM 5000 UNIT(S): 5000 INJECTION INTRAVENOUS; SUBCUTANEOUS at 22:58

## 2021-09-18 RX ADMIN — Medication 25 MILLIGRAM(S): at 17:47

## 2021-09-18 RX ADMIN — LIDOCAINE 1 PATCH: 4 CREAM TOPICAL at 20:53

## 2021-09-18 RX ADMIN — Medication 63.75 MILLIMOLE(S): at 06:46

## 2021-09-18 RX ADMIN — LIDOCAINE 1 PATCH: 4 CREAM TOPICAL at 11:27

## 2021-09-18 NOTE — PROGRESS NOTE ADULT - SUBJECTIVE AND OBJECTIVE BOX
ANAND BREEN                     [id number]    HPI:  81 y.o. female with h/o HTN, HLD, DM, hypothyroidism, presents to PST with preop diagnosis of solitary pulmonary nodule, c/o dyspnea on exertion, denies cough, hemoptysis, scheduled for robotic assisted right video assisted thoracoscopy, right lower lobe wedge resection, mediastinal lymph node dissection (10 Sep 2021 14:31)      Procedure:  Robotic RVATS, RUL wedgex2, RLL wedgex1, Pneumonolysis  14-Sep-2021                     Issues:              Lung nodule              Postop pain              Chest tube in place  HTN  HLD  DM2  Hypothyroidism  Depression  Fever r/o Pneumonia                Home Medications:  atorvastatin 10 mg oral tablet: 1 tab(s) orally once a day am (14 Sep 2021 11:22)  Iron 100 Plus: 1  orally once a day (14 Sep 2021 06:46)  Lyrica 50 mg oral capsule: 1 cap(s) orally once a day (14 Sep 2021 06:46)  meloxicam 15 mg oral tablet: 1 tab(s) orally once a day, last dose 9/10/21 (14 Sep 2021 06:46)  metFORMIN 500 mg oral tablet: 1 tab(s) orally once a day am (14 Sep 2021 06:46)  metoprolol succinate 25 mg oral tablet, extended release: 1 tab(s) orally once a day (at bedtime) (14 Sep 2021 06:46)  Myrbetriq 25 mg oral tablet, extended release: 1 tab(s) orally once a day am (14 Sep 2021 06:46)  pantoprazole 40 mg oral delayed release tablet: 1 tab(s) orally once a day (14 Sep 2021 06:46)  raNITIdine 150 mg oral capsule: 1 cap(s) orally once a day pm (14 Sep 2021 06:46)    PAST MEDICAL & SURGICAL HISTORY:  HTN (hypertension)    HLD (hyperlipidemia)    DM (diabetes mellitus)    Hypothyroidism    Solitary pulmonary nodule    Depression    History of appendectomy    S/P cholecystectomy    H/O total knee replacement  b/l knee    H/O arthroscopy  left shoulder              VITAL SIGNS:  Vital Signs Last 24 Hrs  T(C): 37.9 (18 Sep 2021 12:00), Max: 37.9 (18 Sep 2021 12:00)  T(F): 100.2 (18 Sep 2021 12:00), Max: 100.2 (18 Sep 2021 12:00)  HR: 77 (18 Sep 2021 11:00) (67 - 89)  BP: 109/91 (18 Sep 2021 11:00) (109/91 - 185/64)  BP(mean): 95 (18 Sep 2021 11:00) (54 - 125)  RR: 27 (18 Sep 2021 11:00) (19 - 31)  SpO2: 94% (18 Sep 2021 11:00) (84% - 100%)    I/Os:   I&O's Detail    17 Sep 2021 07:01  -  18 Sep 2021 07:00  --------------------------------------------------------  IN:    IV PiggyBack: 700 mL    Oral Fluid: 790 mL  Total IN: 1490 mL    OUT:    Indwelling Catheter - Urethral (mL): 2110 mL  Total OUT: 2110 mL    Total NET: -620 mL      18 Sep 2021 07:01  -  18 Sep 2021 13:01  --------------------------------------------------------  IN:  Total IN: 0 mL    OUT:    Voided (mL): 100 mL  Total OUT: 100 mL    Total NET: -100 mL          CAPILLARY BLOOD GLUCOSE      POCT Blood Glucose.: 136 mg/dL (18 Sep 2021 11:25)  POCT Blood Glucose.: 138 mg/dL (18 Sep 2021 08:17)  POCT Blood Glucose.: 156 mg/dL (17 Sep 2021 23:25)  POCT Blood Glucose.: 116 mg/dL (17 Sep 2021 16:18)      =======================MEDICATIONS===================  MEDICATIONS  (STANDING):  acetaminophen  IVPB .. 1000 milliGRAM(s) IV Intermittent once  ALBUTerol    0.083% 2.5 milliGRAM(s) Nebulizer every 6 hours  atorvastatin 10 milliGRAM(s) Oral at bedtime  dextrose 50% Injectable 25 Gram(s) IV Push once  heparin   Injectable 5000 Unit(s) SubCutaneous every 8 hours  influenza  Vaccine (HIGH DOSE) 0.7 milliLiter(s) IntraMuscular once  insulin lispro (ADMELOG) corrective regimen sliding scale   SubCutaneous three times a day before meals  levothyroxine 150 MICROGram(s) Oral daily  lidocaine   4% Patch 1 Patch Transdermal every 24 hours  metoprolol succinate ER 25 milliGRAM(s) Oral every 12 hours  pantoprazole    Tablet 40 milliGRAM(s) Oral before breakfast  piperacillin/tazobactam IVPB.. 3.375 Gram(s) IV Intermittent every 8 hours  pregabalin 50 milliGRAM(s) Oral daily  senna 2 Tablet(s) Oral at bedtime    MEDICATIONS  (PRN):  ALBUTerol    90 MICROgram(s) HFA Inhaler 2 Puff(s) Inhalation every 6 hours PRN Shortness of Breath  bisacodyl Suppository 10 milliGRAM(s) Rectal daily PRN Constipation  naloxone Injectable 0.1 milliGRAM(s) IV Push every 3 minutes PRN For ANY of the following changes in patient status:  A. RR LESS THAN 10 breaths per minute, B. Oxygen saturation LESS THAN 90%, C. Sedation score of 6  ondansetron Injectable 4 milliGRAM(s) IV Push every 6 hours PRN Nausea      PHYSICAL EXAM============================  General:                         Awake, alert, not in any distress  Neuro:                            Moving all extremities to commands.   Respiratory:	Air entry fair and  bilateral conducted sounds                                           Effort even and unlabored.  CV:		Regular rate and rhythm. Normal S1/S2                                          Distal pulses present.  Abdomen:	                     Soft, non-distended. Bowel sounds present   Skin:		No rash.  Extremities:	Warm, no cyanosis or edema.  Palpable pulses    ============================LABS=========================                        10.4   7.90  )-----------( 112      ( 18 Sep 2021 05:19 )             32.4     09-18    135  |  97<L>  |  13  ----------------------------<  122<H>  4.6   |  29  |  0.60    Ca    8.5      18 Sep 2021 05:19  Phos  2.0     09-18  Mg     2.20     09-18          ABG - ( 16 Sep 2021 16:09 )  pH, Arterial: 7.31  pH, Blood: x     /  pCO2: 60    /  pO2: 114   / HCO3: 30    / Base Excess: 2.8   /  SaO2: 97.7              Urinalysis Basic - ( 16 Sep 2021 21:05 )    Color: Yellow / Appearance: Clear / S.022 / pH: x  Gluc: x / Ketone: Negative  / Bili: Negative / Urobili: <2 mg/dL   Blood: x / Protein: Trace / Nitrite: Negative   Leuk Esterase: Negative / RBC: 3 /HPF / WBC 6 /HPF   Sq Epi: x / Non Sq Epi: 1 /HPF / Bacteria: Negative      Culture - Acid Fast - Tissue w/Smear (collected 14 Sep 2021 15:04)  Source: .Tissue RIGHT LOWER LOBE    Culture - Fungal, Tissue (collected 14 Sep 2021 15:04)  Source: .Tissue RIGHT LOWER LOBE  Preliminary Report (15 Sep 2021 06:22):    Testing in progress    Culture - Tissue with Gram Stain (collected 14 Sep 2021 15:04)  Source: .Tissue RIGHT LOWER LOBE  Gram Stain (14 Sep 2021 22:03):    No polymorphonuclear leukocytes seen per low power field    No organisms seen per oil power field  Preliminary Report (15 Sep 2021 11:38):    No growth    Culture - Acid Fast - Tissue w/Smear (collected 14 Sep 2021 15:02)  Source: .Tissue RIGHT UPPER LOBE    Culture - Fungal, Tissue (collected 14 Sep 2021 15:02)  Source: .Tissue RIGHT UPPER LOBE  Preliminary Report (15 Sep 2021 06:47):    Testing in progress    Culture - Tissue with Gram Stain (collected 14 Sep 2021 15:02)  Source: .Tissue RIGHT UPPER LOBE  Gram Stain (14 Sep 2021 22:08):      A/P:  81yFemale s/p Robotic RVATS, RUL wedgex2, RLL wedgex1, Pneumonolysis  14-Sep-2021 , experiencing  pain with deep breathing.                             Neuro:                                         Pain control with  Tylenol PRN. Avoid Narcotics                            Cardiovascular:                                          HTN: Continue hemodynamic monitoring and restarted Low dose Lopressor. Titrate lopressor as tolerated     HLD: On Lipitor                            Respiratory:                                         Pt is on 2L  nasal canula, wean off as tolerated.       Febrile yesterday - Cultured and started on antibiotics.                                          Comfortable, not in any distress.                                         Encourage incentive spirometry. Start 3% saline inhalations.                                           Chest tube D/C'D                                                                 Continue bronchodilators, pulmonary toilet                            GI                                         On  DASH  diet as tolerated.                                          Continue Zofran / Reglan for nausea - PRN	                                                                 Renal:                                                                             Monitor I/Os and electrolytes                                                                                        Hem/ Onc:                                                                                  Monitor chest tube output &  signs of bleeding.                                          Follow CBC in AM                           Infectious disease:       Febrile yesterday - Cultured and started on antibiotics for presumed pneumonia.  Monitor Vanco level                                          Monitor for fever / leukocytosis.                                          All surgical incision / chest tube  sites look clean                            Endocrine                                             DM-2 / Hyperglycemia: Continue Accu-Checks with coverage.   Hold oral meds    Pt is on SQ Heparin and Venodyne boots for DVT prophylaxis.     Pertinent clinical, laboratory, radiographic, hemodynamic, echocardiographic, respiratory data, microbiologic data and chart were reviewed and analyzed frequently throughout the course of the day and night  Patient seen, examined and plan discussed with CT Surgeon Dr. Magdaleno  / CTICU team during rounds.    OOB to chair and ambulate as tolerated.     Status discussed with patient /  updated plan of care    I have spent     minutes of critical care time with this pt between  7am and 11.59pm monitoring hemodynamic status, managing fluid resuscitation /  pressors to prevent / worsening of shock and ventilator management.       Carson Goldstein DO, FACEP

## 2021-09-18 NOTE — CHART NOTE - NSCHARTNOTEFT_GEN_A_CORE
Pt w/ COPD POD#4 s/p RVATS, wedgex3, pneumonolysis. Pt requires home Oxygen. Resting RA sat 84%. Difficult for pt to ambulate, rehab was recommended, but family wants to take her home. Pt w/ COPD POD#4 s/p RVATS, wedgex3, pneumonolysis. Pt requires home Oxygen. Resting RA sat 84%. Difficult for pt to ambulate, rehab was recommended, but family wants to take her home. Saturation improved to 96% on 1.5 liters nasal cannula

## 2021-09-18 NOTE — PATIENT PROFILE ADULT - LANGUAGE ASSISTANCE NEEDED
Interpret [id number]/ patient requests son/No-Patient/Caregiver offered and refused free interpretation services.

## 2021-09-19 ENCOUNTER — TRANSCRIPTION ENCOUNTER (OUTPATIENT)
Age: 81
End: 2021-09-19

## 2021-09-19 LAB
ANION GAP SERPL CALC-SCNC: 9 MMOL/L — SIGNIFICANT CHANGE UP (ref 7–14)
BUN SERPL-MCNC: 12 MG/DL — SIGNIFICANT CHANGE UP (ref 7–23)
CALCIUM SERPL-MCNC: 8.5 MG/DL — SIGNIFICANT CHANGE UP (ref 8.4–10.5)
CHLORIDE SERPL-SCNC: 101 MMOL/L — SIGNIFICANT CHANGE UP (ref 98–107)
CO2 SERPL-SCNC: 30 MMOL/L — SIGNIFICANT CHANGE UP (ref 22–31)
CREAT SERPL-MCNC: 0.6 MG/DL — SIGNIFICANT CHANGE UP (ref 0.5–1.3)
CULTURE RESULTS: SIGNIFICANT CHANGE UP
GLUCOSE SERPL-MCNC: 123 MG/DL — HIGH (ref 70–99)
HCT VFR BLD CALC: 34.1 % — LOW (ref 34.5–45)
HGB BLD-MCNC: 10.8 G/DL — LOW (ref 11.5–15.5)
MAGNESIUM SERPL-MCNC: 2.2 MG/DL — SIGNIFICANT CHANGE UP (ref 1.6–2.6)
MCHC RBC-ENTMCNC: 28.3 PG — SIGNIFICANT CHANGE UP (ref 27–34)
MCHC RBC-ENTMCNC: 31.7 GM/DL — LOW (ref 32–36)
MCV RBC AUTO: 89.5 FL — SIGNIFICANT CHANGE UP (ref 80–100)
NRBC # BLD: 0 /100 WBCS — SIGNIFICANT CHANGE UP
NRBC # FLD: 0 K/UL — SIGNIFICANT CHANGE UP
PHOSPHATE SERPL-MCNC: 2.5 MG/DL — SIGNIFICANT CHANGE UP (ref 2.5–4.5)
PLATELET # BLD AUTO: 103 K/UL — LOW (ref 150–400)
POTASSIUM SERPL-MCNC: 4.6 MMOL/L — SIGNIFICANT CHANGE UP (ref 3.5–5.3)
POTASSIUM SERPL-SCNC: 4.6 MMOL/L — SIGNIFICANT CHANGE UP (ref 3.5–5.3)
RBC # BLD: 3.81 M/UL — SIGNIFICANT CHANGE UP (ref 3.8–5.2)
RBC # FLD: 14.7 % — HIGH (ref 10.3–14.5)
SODIUM SERPL-SCNC: 140 MMOL/L — SIGNIFICANT CHANGE UP (ref 135–145)
SPECIMEN SOURCE: SIGNIFICANT CHANGE UP
WBC # BLD: 8.24 K/UL — SIGNIFICANT CHANGE UP (ref 3.8–10.5)
WBC # FLD AUTO: 8.24 K/UL — SIGNIFICANT CHANGE UP (ref 3.8–10.5)

## 2021-09-19 PROCEDURE — 99233 SBSQ HOSP IP/OBS HIGH 50: CPT

## 2021-09-19 PROCEDURE — 71045 X-RAY EXAM CHEST 1 VIEW: CPT | Mod: 26

## 2021-09-19 RX ADMIN — PIPERACILLIN AND TAZOBACTAM 25 GRAM(S): 4; .5 INJECTION, POWDER, LYOPHILIZED, FOR SOLUTION INTRAVENOUS at 22:50

## 2021-09-19 RX ADMIN — ATORVASTATIN CALCIUM 10 MILLIGRAM(S): 80 TABLET, FILM COATED ORAL at 22:50

## 2021-09-19 RX ADMIN — PANTOPRAZOLE SODIUM 40 MILLIGRAM(S): 20 TABLET, DELAYED RELEASE ORAL at 06:26

## 2021-09-19 RX ADMIN — LIDOCAINE 1 PATCH: 4 CREAM TOPICAL at 19:05

## 2021-09-19 RX ADMIN — Medication 25 MILLIGRAM(S): at 17:28

## 2021-09-19 RX ADMIN — ALBUTEROL 2 PUFF(S): 90 AEROSOL, METERED ORAL at 22:10

## 2021-09-19 RX ADMIN — HEPARIN SODIUM 5000 UNIT(S): 5000 INJECTION INTRAVENOUS; SUBCUTANEOUS at 22:50

## 2021-09-19 RX ADMIN — PIPERACILLIN AND TAZOBACTAM 25 GRAM(S): 4; .5 INJECTION, POWDER, LYOPHILIZED, FOR SOLUTION INTRAVENOUS at 15:19

## 2021-09-19 RX ADMIN — SENNA PLUS 2 TABLET(S): 8.6 TABLET ORAL at 22:50

## 2021-09-19 RX ADMIN — HEPARIN SODIUM 5000 UNIT(S): 5000 INJECTION INTRAVENOUS; SUBCUTANEOUS at 05:26

## 2021-09-19 RX ADMIN — PIPERACILLIN AND TAZOBACTAM 25 GRAM(S): 4; .5 INJECTION, POWDER, LYOPHILIZED, FOR SOLUTION INTRAVENOUS at 05:27

## 2021-09-19 RX ADMIN — Medication 150 MICROGRAM(S): at 05:26

## 2021-09-19 RX ADMIN — Medication 25 MILLIGRAM(S): at 05:27

## 2021-09-19 RX ADMIN — Medication 1: at 16:40

## 2021-09-19 RX ADMIN — LIDOCAINE 1 PATCH: 4 CREAM TOPICAL at 13:25

## 2021-09-19 RX ADMIN — Medication 50 MILLIGRAM(S): at 13:27

## 2021-09-19 RX ADMIN — HEPARIN SODIUM 5000 UNIT(S): 5000 INJECTION INTRAVENOUS; SUBCUTANEOUS at 13:26

## 2021-09-19 NOTE — DISCHARGE NOTE PROVIDER - CARE PROVIDER_API CALL
Ambreen Magdaleno)  Surgery; Thoracic Surgery  [id number] [place] [address]  Phone: [phone]  Fax: [phone]  Follow Up Time:

## 2021-09-19 NOTE — DISCHARGE NOTE PROVIDER - NSDCCPTREATMENT_GEN_ALL_CORE_FT
PRINCIPAL PROCEDURE  Procedure: Robotic assistance in thoracoscopic procedure  Findings and Treatment: Wedgex3, Pneumonolysis

## 2021-09-19 NOTE — DISCHARGE NOTE PROVIDER - HOSPITAL COURSE
HPI:  81 y.o. female with h/o HTN, HLD, DM, hypothyroidism, presents to PST with preop diagnosis of solitary pulmonary nodule, c/o dyspnea on exertion, denies cough, hemoptysis, scheduled for robotic assisted right video assisted thoracoscopy, right lower lobe wedge resection, mediastinal lymph node dissection (10 Sep 2021 14:31)She tolerated the procedure well. post op pt had fever and hypoxia requiring supplementation. , cultures were negative, Zosyn was started. She is now afebrile, WBC 8. PT recommended rehab, but pt family refused, they want to take her home. Home PT and Oxygen have been arranged.    Patient had an exposure to a covid positive person while you were in the hospital. You must quarantine until 9/30/2021.  You should take your temperature daily and contact your PCP if you have a fever or new cough/cold symptoms/shortness of breath. Please have a covid test done between 9/24-9/26 HPI:  81 y.o. female with h/o HTN, HLD, DM, hypothyroidism, presents to PST with preop diagnosis of solitary pulmonary nodule, c/o dyspnea on exertion, denies cough, hemoptysis, scheduled for robotic assisted right video assisted thoracoscopy, right lower lobe wedge resection, mediastinal lymph node dissection (10 Sep 2021 14:31)She tolerated the procedure well. post op pt had fever and hypoxia requiring supplementation. , cultures were negative, Zosyn was started, will be discharged home on 3 days of augmentin. She is now afebrile, WBC 8. PT recommended rehab, but pt family refused, they want to take her home. Home PT and Oxygen have been arranged.     Patient had an exposure to a covid positive person while you she was in the hospital. She must quarantine until 9/30/2021.  It is recommended to take your temperature daily and contact your PCP if you have a fever or new cough/cold symptoms/shortness of breath. Please have a covid test done between 9/24-9/26 HPI:  81 y.o. female with h/o HTN, HLD, DM, hypothyroidism, presents to PST with preop diagnosis of solitary pulmonary nodule, c/o dyspnea on exertion, denies cough, hemoptysis, scheduled for robotic assisted right video assisted thoracoscopy, right lower lobe wedge resection, mediastinal lymph node dissection (10 Sep 2021 14:31)She tolerated the procedure well. post op pt had fever and hypoxia requiring supplementation. , cultures were negative, Zosyn was started, will be discharged home on 3 days of augmentin. She is now afebrile, WBC 8. PT recommended rehab, but pt family refused, they want to take her home. Home PT and Oxygen have been arranged. Concentrator at bedside and rest of equipment will be delivered today at home, ok to discharge home from  standpoint.      Patient had an exposure to a covid positive person while you she was in the hospital. She must quarantine until 9/30/2021.  It is recommended to take your temperature daily and contact your PCP if you have a fever or new cough/cold symptoms/shortness of breath. Please have a covid test done between 9/24-9/26

## 2021-09-19 NOTE — DISCHARGE NOTE PROVIDER - NSDCMRMEDTOKEN_GEN_ALL_CORE_FT
atorvastatin 10 mg oral tablet: 1 tab(s) orally once a day am  Iron 100 Plus: 1  orally once a day  Lyrica 50 mg oral capsule: 1 cap(s) orally once a day  meloxicam 15 mg oral tablet: 1 tab(s) orally once a day, last dose 9/10/21  metFORMIN 500 mg oral tablet: 1 tab(s) orally once a day am  metoprolol succinate 25 mg oral tablet, extended release: 1 tab(s) orally once a day (at bedtime)  Myrbetriq 25 mg oral tablet, extended release: 1 tab(s) orally once a day am  pantoprazole 40 mg oral delayed release tablet: 1 tab(s) orally once a day  raNITIdine 150 mg oral capsule: 1 cap(s) orally once a day pm  Synthroid 150 mcg (0.15 mg) oral tablet: 1 tab(s) orally once a day am  valsartan-hydrochlorothiazide 160mg-25mg oral tablet: 1 tab(s) orally once a day noon  Vitamin B12 1000 mcg oral tablet: 1 tab(s) orally once a day   atorvastatin 10 mg oral tablet: 1 tab(s) orally once a day am  Augmentin 875 mg-125 mg oral tablet: 2 tab(s) orally 2 times a day   Colace 100 mg oral capsule: 1 cap(s) orally 3 times a day, As Needed -for constipation   CXR PA/Lateral: CXR PA/Lat  S/p R VATS, wedge resection  Please give copy of imaging to patient and fax results to Dr. Magdaleno&#x27;s office at [phone], thank you   Iron 100 Plus: 1  orally once a day  Lyrica 50 mg oral capsule: 1 cap(s) orally once a day  meloxicam 15 mg oral tablet: 1 tab(s) orally once a day, last dose 9/10/21  metFORMIN 500 mg oral tablet: 1 tab(s) orally once a day am  metoprolol succinate 25 mg oral tablet, extended release: 1 tab(s) orally once a day (at bedtime)  Myrbetriq 25 mg oral tablet, extended release: 1 tab(s) orally once a day am  pantoprazole 40 mg oral delayed release tablet: 1 tab(s) orally once a day  raNITIdine 150 mg oral capsule: 1 cap(s) orally once a day pm  Synthroid 150 mcg (0.15 mg) oral tablet: 1 tab(s) orally once a day am  valsartan-hydrochlorothiazide 160mg-25mg oral tablet: 1 tab(s) orally once a day noon  Vitamin B12 1000 mcg oral tablet: 1 tab(s) orally once a day   atorvastatin 10 mg oral tablet: 1 tab(s) orally once a day am  Augmentin 875 mg-125 mg oral tablet: 1 tab(s) orally every 12 hours   Colace 100 mg oral capsule: 1 cap(s) orally 3 times a day, As Needed -for constipation   CXR PA/Lateral: CXR PA/Lat  S/p R VATS, wedge resection  Please give copy of imaging to patient and fax results to Dr. Magdaleno&#x27;s office at [phone], thank you   Iron 100 Plus: 1  orally once a day  Lyrica 50 mg oral capsule: 1 cap(s) orally once a day  meloxicam 15 mg oral tablet: 1 tab(s) orally once a day, last dose 9/10/21  metFORMIN 500 mg oral tablet: 1 tab(s) orally once a day am  metoprolol succinate 25 mg oral tablet, extended release: 1 tab(s) orally once a day (at bedtime)  Myrbetriq 25 mg oral tablet, extended release: 1 tab(s) orally once a day am  pantoprazole 40 mg oral delayed release tablet: 1 tab(s) orally once a day  raNITIdine 150 mg oral capsule: 1 cap(s) orally once a day pm  Synthroid 150 mcg (0.15 mg) oral tablet: 1 tab(s) orally once a day am  valsartan-hydrochlorothiazide 160mg-25mg oral tablet: 1 tab(s) orally once a day noon  Vitamin B12 1000 mcg oral tablet: 1 tab(s) orally once a day   acetaminophen 325 mg oral tablet: 2 tab(s) orally every 8 hours, As Needed -for moderate pain   atorvastatin 10 mg oral tablet: 1 tab(s) orally once a day am  Augmentin 875 mg-125 mg oral tablet: 1 tab(s) orally every 12 hours   Colace 100 mg oral capsule: 1 cap(s) orally 3 times a day, As Needed -for constipation   CXR PA/Lateral: CXR PA/Lat  S/p R VATS, wedge resection  Please give copy of imaging to patient and fax results to Dr. Magdaleno&#x27;s office at [phone], thank you   Iron 100 Plus: 1  orally once a day  Lyrica 50 mg oral capsule: 1 cap(s) orally once a day  meloxicam 15 mg oral tablet: 1 tab(s) orally once a day, last dose 9/10/21  metFORMIN 500 mg oral tablet: 1 tab(s) orally once a day am  metoprolol succinate 25 mg oral tablet, extended release: 1 tab(s) orally once a day (at bedtime)  Myrbetriq 25 mg oral tablet, extended release: 1 tab(s) orally once a day am  oxyCODONE 5 mg oral tablet: 1 tab(s) orally every 8 hours, As Needed -for severe pain MDD:3  pantoprazole 40 mg oral delayed release tablet: 1 tab(s) orally once a day  raNITIdine 150 mg oral capsule: 1 cap(s) orally once a day pm  Synthroid 150 mcg (0.15 mg) oral tablet: 1 tab(s) orally once a day am  valsartan-hydrochlorothiazide 160mg-25mg oral tablet: 1 tab(s) orally once a day noon  Vitamin B12 1000 mcg oral tablet: 1 tab(s) orally once a day

## 2021-09-19 NOTE — DISCHARGE NOTE PROVIDER - NSDCFUADDINST_GEN_ALL_CORE_FT
If you develop fever, shortness of breath/chest pain or wound redness/foul drainage-please call Dr Magdaleno's office

## 2021-09-19 NOTE — PROGRESS NOTE ADULT - SUBJECTIVE AND OBJECTIVE BOX
ANAND BREEN      81y   Female   [id number]         No Known Allergies             Daily     Daily Drug Dosing Weight  Height (cm): 175.3 (14 Sep 2021 13:57)  Weight (kg): 75.2 (14 Sep 2021 13:57)  BMI (kg/m2): 24.5 (14 Sep 2021 13:57)  BSA (m2): 1.91 (14 Sep 2021 13:57)    81 y.o. female with h/o HTN, HLD, DM, hypothyroidism, presents to [facility] with preop diagnosis of solitary pulmonary nodule, c/o dyspnea on exertion, denies cough, hemoptysis, scheduled for robotic assisted right video assisted thoracoscopy, right lower lobe wedge resection, mediastinal lymph node dissection (10 Sep 2021 14:31).     Procedure:  Robotic RVATS, RUL wedgex2, RLL wedgex1, Pneumonolysis  14-Sep-2021                     Issues:              Lung nodule              Postop pain              Chest tube in place  HTN  HLD  DM2  Hypothyroidism  Depression  Fever r/o pneumonia              Home Medications:  atorvastatin 10 mg oral tablet: 1 tab(s) orally once a day am (14 Sep 2021 11:22)  Iron 100 Plus: 1  orally once a day (14 Sep 2021 06:46)  Lyrica 50 mg oral capsule: 1 cap(s) orally once a day (14 Sep 2021 06:46)  meloxicam 15 mg oral tablet: 1 tab(s) orally once a day, last dose 9/10/21 (14 Sep 2021 06:46)  metFORMIN 500 mg oral tablet: 1 tab(s) orally once a day am (14 Sep 2021 06:46)  metoprolol succinate 25 mg oral tablet, extended release: 1 tab(s) orally once a day (at bedtime) (14 Sep 2021 06:46)  Myrbetriq 25 mg oral tablet, extended release: 1 tab(s) orally once a day am (14 Sep 2021 06:46)  pantoprazole 40 mg oral delayed release tablet: 1 tab(s) orally once a day (14 Sep 2021 06:46)  raNITIdine 150 mg oral capsule: 1 cap(s) orally once a day pm (14 Sep 2021 06:46)  Synthroid 150 mcg (0.15 mg) oral tablet: 1 tab(s) orally once a day am (14 Sep 2021 06:46)  valsartan-hydrochlorothiazide 160mg-25mg oral tablet: 1 tab(s) orally once a day noon (14 Sep 2021 06:46)  Vitamin B12 1000 mcg oral tablet: 1 tab(s) orally once a day (14 Sep 2021 06:46)    PAST MEDICAL & SURGICAL HISTORY:  HTN (hypertension)    HLD (hyperlipidemia)    DM (diabetes mellitus)    Hypothyroidism    Solitary pulmonary nodule    Depression    History of appendectomy    S/P cholecystectomy    H/O total knee replacement  b/l knee    H/O arthroscopy  left shoulder      Vital Signs Last 24 Hrs  T(C): 36.7 (19 Sep 2021 04:00), Max: 37.9 (18 Sep 2021 12:00)  T(F): 98 (19 Sep 2021 04:00), Max: 100.2 (18 Sep 2021 12:00)  HR: 75 (19 Sep 2021 07:00) (68 - 83)  BP: 153/42 (19 Sep 2021 07:00) (109/91 - 153/42)  BP(mean): 64 (19 Sep 2021 07:00) (64 - 95)  RR: 26 (19 Sep 2021 07:00) (15 - 27)  SpO2: 97% (19 Sep 2021 07:00) (94% - 100%)  I&O's Detail    18 Sep 2021 07:01  -  19 Sep 2021 07:00  --------------------------------------------------------  IN:    IV PiggyBack: 100 mL    Oral Fluid: 720 mL  Total IN: 820 mL    OUT:    Indwelling Catheter - Urethral (mL): 250 mL    Voided (mL): 600 mL  Total OUT: 850 mL    Total NET: -30 mL        CAPILLARY BLOOD GLUCOSE      POCT Blood Glucose.: 116 mg/dL (19 Sep 2021 06:23)  POCT Blood Glucose.: 134 mg/dL (18 Sep 2021 16:36)  POCT Blood Glucose.: 136 mg/dL (18 Sep 2021 11:25)    Home Medications:  atorvastatin 10 mg oral tablet: 1 tab(s) orally once a day am (14 Sep 2021 11:22)  Iron 100 Plus: 1  orally once a day (14 Sep 2021 06:46)  Lyrica 50 mg oral capsule: 1 cap(s) orally once a day (14 Sep 2021 06:46)  meloxicam 15 mg oral tablet: 1 tab(s) orally once a day, last dose 9/10/21 (14 Sep 2021 06:46)  metFORMIN 500 mg oral tablet: 1 tab(s) orally once a day am (14 Sep 2021 06:46)  metoprolol succinate 25 mg oral tablet, extended release: 1 tab(s) orally once a day (at bedtime) (14 Sep 2021 06:46)  Myrbetriq 25 mg oral tablet, extended release: 1 tab(s) orally once a day am (14 Sep 2021 06:46)  pantoprazole 40 mg oral delayed release tablet: 1 tab(s) orally once a day (14 Sep 2021 06:46)  raNITIdine 150 mg oral capsule: 1 cap(s) orally once a day pm (14 Sep 2021 06:46)  Synthroid 150 mcg (0.15 mg) oral tablet: 1 tab(s) orally once a day am (14 Sep 2021 06:46)  valsartan-hydrochlorothiazide 160mg-25mg oral tablet: 1 tab(s) orally once a day noon (14 Sep 2021 06:46)  Vitamin B12 1000 mcg oral tablet: 1 tab(s) orally once a day (14 Sep 2021 06:46)    MEDICATIONS  (STANDING):  acetaminophen  IVPB .. 1000 milliGRAM(s) IV Intermittent once  ALBUTerol    0.083% 2.5 milliGRAM(s) Nebulizer every 6 hours  atorvastatin 10 milliGRAM(s) Oral at bedtime  dextrose 50% Injectable 25 Gram(s) IV Push once  heparin   Injectable 5000 Unit(s) SubCutaneous every 8 hours  influenza  Vaccine (HIGH DOSE) 0.7 milliLiter(s) IntraMuscular once  insulin lispro (ADMELOG) corrective regimen sliding scale   SubCutaneous three times a day before meals  levothyroxine 150 MICROGram(s) Oral daily  lidocaine   4% Patch 1 Patch Transdermal every 24 hours  metoprolol tartrate 25 milliGRAM(s) Oral two times a day  pantoprazole    Tablet 40 milliGRAM(s) Oral before breakfast  piperacillin/tazobactam IVPB.. 3.375 Gram(s) IV Intermittent every 8 hours  pregabalin 50 milliGRAM(s) Oral daily  senna 2 Tablet(s) Oral at bedtime    MEDICATIONS  (PRN):  acetaminophen   Tablet .. 650 milliGRAM(s) Oral every 6 hours PRN Temp greater or equal to 38C (100.4F), Mild Pain (1 - 3)  ALBUTerol    90 MICROgram(s) HFA Inhaler 2 Puff(s) Inhalation every 6 hours PRN Shortness of Breath  bisacodyl Suppository 10 milliGRAM(s) Rectal daily PRN Constipation  naloxone Injectable 0.1 milliGRAM(s) IV Push every 3 minutes PRN For ANY of the following changes in patient status:  A. RR LESS THAN 10 breaths per minute, B. Oxygen saturation LESS THAN 90%, C. Sedation score of 6  ondansetron Injectable 4 milliGRAM(s) IV Push every 6 hours PRN Nausea        Physical exam:     General:               Pt is awake, alert,  appears to be in pain but not in  distress                                                  Neuro:                  Nonfocal                             Cardiovascular:   S1 & S2, regular                          Respiratory:         Air entry is fair and equal on both sides, has bilateral conducted sounds                           GI:                          Soft, nondistended and nontender, Bowel sounds active                            Ext:                        No cyanosis or edema                            Labs:                                                                           10.8   8.24  )-----------( 103      ( 19 Sep 2021 04:21 )             34.1             -    140  |  101  |  12  ----------------------------<  123<H>  4.6   |  30  |  0.60    Ca    8.5      19 Sep 2021 04:21  Phos  2.5       Mg     2.20     -                        Urinalysis Basic - ( 18 Sep 2021 20:31 )    Color: Yellow / Appearance: Clear / S.031 / pH: x  Gluc: x / Ketone: Trace  / Bili: Negative / Urobili: <2 mg/dL   Blood: x / Protein: Trace / Nitrite: Negative   Leuk Esterase: Negative / RBC: 44 /HPF / WBC 2 /HPF   Sq Epi: x / Non Sq Epi: 1 /HPF / Bacteria: Negative        Culture - Sputum (collected 18 Sep 2021 21:12)  Source: .Sputum Sputum  Gram Stain (18 Sep 2021 22:29):    Few Squamous epithelial cells per low power field    Few polymorphonuclear leukocytes per low power field    Few Gram Positive Cocci in Pairs and Chains per oil power field    Culture - Blood (collected 17 Sep 2021 08:16)  Source: .Blood Blood  Preliminary Report (18 Sep 2021 09:01):    No growth to date.    Culture - Sputum (collected 16 Sep 2021 22:00)  Source: .Sputum Sputum  Gram Stain (17 Sep 2021 17:05):    Rare polymorphonuclear leukocytes per low power field    Rare Squamous epithelial cells per low power field    Rare Gram positive cocci in pairs per oil power field  Final Report (19 Sep 2021 09:32):    Normal Respiratory Mary Anne present      CXR:  < from: Xray Chest 1 View- PORTABLE-Routine (Xray Chest 1 View- PORTABLE-Routine in AM.) (21 @ 04:55) >  Small residual, postop loculated effusion/atelectasis on the right. Remainder of the lungs are clear. Trace left effusion. Heart is enlarged and stable. Residual subcutaneous air in the right chest.      COMPARISON:        IMPRESSION:  Follow-up post right thoracotomy with postop changes and without significant change from recent exam        Plan:  General: 81yFemale s/p Robotic RVATS, RUL wedgex2, RLL wedgex1, Pneumonolysis  -Sep-2021 , experiencing  pain with deep breathing.                             Neuro:                                         Pain control with  Tylenol PRN. Avoid Narcotics                            Cardiovascular:                                          HTN: Continue hemodynamic monitoring. Tolerating  Lopressor 25mg q12hrs.     HLD: On Lipitor                            Respiratory:                                         Pt is on 2L  nasal canula, did not tolerate weaning      Febrile yesterday - Cultured and and on antibiotics.                                          Comfortable, not in any distress.                                         Encourage incentive spirometry. Start 3% saline inhalations.                                           Chest tube D/CD                                                                 Continue bronchodilators, pulmonary toilet     Keep patient in isolation / droplet precautions because of exposure to staff with COVID  Pt and family is aware of exposure and advised to take quarantine precautions when discharged to home                             GI                                         On  DASH  diet as tolerated.                                          Continue Zofran / Reglan for nausea - PRN	                                                                 Renal:                                         Continue LR  30cc/hr                                         Monitor I/Os and electrolytes                                                                                        Hem/ Onc:                                                                                  Monitor chest tube output &  signs of bleeding.                                          Follow CBC in AM                           Infectious disease:       Febrile yesterday - Cultured and started on antibiotics for presumed pneumonia.                                          Monitor for fever / leukocytosis.                                          All surgical incision / chest tube  sites look clean                            Endocrine                                             DM-2 / Hyperglycemia: Continue Accu-Checks with coverage.   Hold oral meds    Pt is on SQ Heparin and Venodyne boots for DVT prophylaxis.     Pertinent clinical, laboratory, radiographic, hemodynamic, echocardiographic, respiratory data, microbiologic data and chart were reviewed and analyzed frequently throughout the course of the day and night  Patient seen, examined and plan discussed with CT Surgeon Dr. Mora  / CTICU team during rounds.    OOB to chair and ambulate as tolerated.           Kendrick Seals MD

## 2021-09-19 NOTE — DISCHARGE NOTE PROVIDER - NSDCFUADDAPPT_GEN_ALL_CORE_FT
You have had an exposure to a covid positive person while you were in the hospital. You should take your temperature daily and contact your PCP if you are febrile. Please have a covid test done between 9/23-9/25 You have had an exposure to a covid positive person while you were in the hospital. You must quarantine until 9/30/2021.  You should take your temperature daily and contact your PCP if you have a fever or new cough/cold symptoms/shortness of breath. Please have a covid test done between 9/24-9/26 You have had an exposure to a covid positive person while you were in the hospital. You must quarantine until 9/30/2021.  You should take your temperature daily and contact your PCP if you have a fever or new cough/cold symptoms/shortness of breath. Please have a covid test done between 9/24-9/26  Please call Dr Magdaleno's office to schedule a follow up appointment in 1 week. Have a chest xray done 1-2 days prior to your appointment and bring a copy with you to the office on the day of your appointment. Please arrange a follow up visit with your PCP in 1-2 weeks.

## 2021-09-19 NOTE — DISCHARGE NOTE PROVIDER - NSDCHHBASESERVICE_GEN_ALL_CORE
Nursing/Physical therapy
I have evaluated the patient in conjunction with the resident and agree with the above history, physical, assessment, and plan

## 2021-09-20 ENCOUNTER — TRANSCRIPTION ENCOUNTER (OUTPATIENT)
Age: 81
End: 2021-09-20

## 2021-09-20 VITALS
DIASTOLIC BLOOD PRESSURE: 51 MMHG | OXYGEN SATURATION: 98 % | HEART RATE: 72 BPM | SYSTOLIC BLOOD PRESSURE: 165 MMHG | RESPIRATION RATE: 25 BRPM

## 2021-09-20 LAB
ANION GAP SERPL CALC-SCNC: 6 MMOL/L — LOW (ref 7–14)
BUN SERPL-MCNC: 8 MG/DL — SIGNIFICANT CHANGE UP (ref 7–23)
CALCIUM SERPL-MCNC: 8.4 MG/DL — SIGNIFICANT CHANGE UP (ref 8.4–10.5)
CHLORIDE SERPL-SCNC: 103 MMOL/L — SIGNIFICANT CHANGE UP (ref 98–107)
CO2 SERPL-SCNC: 30 MMOL/L — SIGNIFICANT CHANGE UP (ref 22–31)
CREAT SERPL-MCNC: 0.57 MG/DL — SIGNIFICANT CHANGE UP (ref 0.5–1.3)
CULTURE RESULTS: SIGNIFICANT CHANGE UP
GLUCOSE SERPL-MCNC: 127 MG/DL — HIGH (ref 70–99)
HCT VFR BLD CALC: 31 % — LOW (ref 34.5–45)
HGB BLD-MCNC: 10 G/DL — LOW (ref 11.5–15.5)
MAGNESIUM SERPL-MCNC: 2.2 MG/DL — SIGNIFICANT CHANGE UP (ref 1.6–2.6)
MCHC RBC-ENTMCNC: 29.3 PG — SIGNIFICANT CHANGE UP (ref 27–34)
MCHC RBC-ENTMCNC: 32.3 GM/DL — SIGNIFICANT CHANGE UP (ref 32–36)
MCV RBC AUTO: 90.9 FL — SIGNIFICANT CHANGE UP (ref 80–100)
NRBC # BLD: 0 /100 WBCS — SIGNIFICANT CHANGE UP
NRBC # FLD: 0 K/UL — SIGNIFICANT CHANGE UP
PHOSPHATE SERPL-MCNC: 2.8 MG/DL — SIGNIFICANT CHANGE UP (ref 2.5–4.5)
PLATELET # BLD AUTO: 106 K/UL — LOW (ref 150–400)
POTASSIUM SERPL-MCNC: 4.3 MMOL/L — SIGNIFICANT CHANGE UP (ref 3.5–5.3)
POTASSIUM SERPL-SCNC: 4.3 MMOL/L — SIGNIFICANT CHANGE UP (ref 3.5–5.3)
RBC # BLD: 3.41 M/UL — LOW (ref 3.8–5.2)
RBC # FLD: 14.6 % — HIGH (ref 10.3–14.5)
SODIUM SERPL-SCNC: 139 MMOL/L — SIGNIFICANT CHANGE UP (ref 135–145)
SPECIMEN SOURCE: SIGNIFICANT CHANGE UP
WBC # BLD: 7.66 K/UL — SIGNIFICANT CHANGE UP (ref 3.8–10.5)
WBC # FLD AUTO: 7.66 K/UL — SIGNIFICANT CHANGE UP (ref 3.8–10.5)

## 2021-09-20 PROCEDURE — 99233 SBSQ HOSP IP/OBS HIGH 50: CPT

## 2021-09-20 PROCEDURE — 71045 X-RAY EXAM CHEST 1 VIEW: CPT | Mod: 26

## 2021-09-20 RX ORDER — ACETAMINOPHEN 500 MG
2 TABLET ORAL
Qty: 24 | Refills: 0
Start: 2021-09-20 | End: 2021-09-23

## 2021-09-20 RX ORDER — VALSARTAN 80 MG/1
160 TABLET ORAL ONCE
Refills: 0 | Status: COMPLETED | OUTPATIENT
Start: 2021-09-20 | End: 2021-09-20

## 2021-09-20 RX ORDER — ACETAMINOPHEN 500 MG
1000 TABLET ORAL ONCE
Refills: 0 | Status: COMPLETED | OUTPATIENT
Start: 2021-09-20 | End: 2021-09-20

## 2021-09-20 RX ORDER — VALSARTAN 80 MG/1
160 TABLET ORAL DAILY
Refills: 0 | Status: DISCONTINUED | OUTPATIENT
Start: 2021-09-20 | End: 2021-09-20

## 2021-09-20 RX ORDER — OXYCODONE HYDROCHLORIDE 5 MG/1
1 TABLET ORAL
Qty: 12 | Refills: 0
Start: 2021-09-20 | End: 2021-09-23

## 2021-09-20 RX ORDER — DOCUSATE SODIUM 100 MG
1 CAPSULE ORAL
Qty: 21 | Refills: 0
Start: 2021-09-20 | End: 2021-09-26

## 2021-09-20 RX ADMIN — HEPARIN SODIUM 5000 UNIT(S): 5000 INJECTION INTRAVENOUS; SUBCUTANEOUS at 06:05

## 2021-09-20 RX ADMIN — PIPERACILLIN AND TAZOBACTAM 25 GRAM(S): 4; .5 INJECTION, POWDER, LYOPHILIZED, FOR SOLUTION INTRAVENOUS at 06:04

## 2021-09-20 RX ADMIN — Medication 150 MICROGRAM(S): at 06:04

## 2021-09-20 RX ADMIN — Medication 1000 MILLIGRAM(S): at 12:00

## 2021-09-20 RX ADMIN — Medication 25 MILLIGRAM(S): at 06:04

## 2021-09-20 RX ADMIN — PANTOPRAZOLE SODIUM 40 MILLIGRAM(S): 20 TABLET, DELAYED RELEASE ORAL at 06:04

## 2021-09-20 RX ADMIN — Medication 50 MILLIGRAM(S): at 11:48

## 2021-09-20 RX ADMIN — VALSARTAN 160 MILLIGRAM(S): 80 TABLET ORAL at 13:55

## 2021-09-20 RX ADMIN — HEPARIN SODIUM 5000 UNIT(S): 5000 INJECTION INTRAVENOUS; SUBCUTANEOUS at 13:58

## 2021-09-20 RX ADMIN — Medication 400 MILLIGRAM(S): at 11:59

## 2021-09-20 RX ADMIN — LIDOCAINE 1 PATCH: 4 CREAM TOPICAL at 01:30

## 2021-09-20 NOTE — DISCHARGE NOTE NURSING/CASE MANAGEMENT/SOCIAL WORK - NSDCFUADDAPPT_GEN_ALL_CORE_FT
You have had an exposure to a covid positive person while you were in the hospital. You must quarantine until 9/30/2021.  You should take your temperature daily and contact your PCP if you have a fever or new cough/cold symptoms/shortness of breath. Please have a covid test done between 9/24-9/26  Please call Dr Magdaleno's office to schedule a follow up appointment in 1 week. Have a chest xray done 1-2 days prior to your appointment and bring a copy with you to the office on the day of your appointment. Please arrange a follow up visit with your PCP in 1-2 weeks.

## 2021-09-20 NOTE — DISCHARGE NOTE NURSING/CASE MANAGEMENT/SOCIAL WORK - PATIENT PORTAL LINK FT
You can access the FollowMyHealth Patient Portal offered by [facility] by registering at the following website: [email or web address]. By joining [facility]’s FollowMyHealth portal, you will also be able to view your health information using other applications (apps) compatible with our system.

## 2021-09-20 NOTE — PROGRESS NOTE ADULT - PROVIDER SPECIALTY LIST ADULT
Critical Care
Pain Medicine
Critical Care
Critical Care
Pain Medicine
Critical Care

## 2021-09-20 NOTE — PROGRESS NOTE ADULT - SUBJECTIVE AND OBJECTIVE BOX
ANAND BREEN                     [id number]    HPI:  81 y.o. female with h/o HTN, HLD, DM, hypothyroidism, presents to [facility] with preop diagnosis of solitary pulmonary nodule, c/o dyspnea on exertion, denies cough, hemoptysis, scheduled for robotic assisted right video assisted thoracoscopy, right lower lobe wedge resection, mediastinal lymph node dissection (10 Sep 2021 14:31)      Procedure:  Robotic RVATS, RUL wedgex2, RLL wedgex1, Pneumonolysis  14-Sep-2021                     Issues:              Lung nodule              Postop pain              Chest tube in place  HTN  HLD  DM2  Hypothyroidism  Depression  Fever r/o pneumonia              Home Medications:  atorvastatin 10 mg oral tablet: 1 tab(s) orally once a day am (14 Sep 2021 11:22)  Iron 100 Plus: 1  orally once a day (14 Sep 2021 06:46)  Lyrica 50 mg oral capsule: 1 cap(s) orally once a day (14 Sep 2021 06:46)  meloxicam 15 mg oral tablet: 1 tab(s) orally once a day, last dose 9/10/21 (14 Sep 2021 06:46)  metFORMIN 500 mg oral tablet: 1 tab(s) orally once a day am (14 Sep 2021 06:46)  metoprolol succinate 25 mg oral tablet, extended release: 1 tab(s) orally once a day (at bedtime) (14 Sep 2021 06:46)  Myrbetriq 25 mg oral tablet, extended release: 1 tab(s) orally once a day am (14 Sep 2021 06:46)  pantoprazole 40 mg oral delayed release tablet: 1 tab(s) orally once a day (14 Sep 2021 06:46)  raNITIdine 150 mg oral capsule: 1 cap(s) orally once a day pm (14 Sep 2021 06:46)  Synthroid 150 mcg (0.15 mg) oral tablet: 1 tab(s) orally once a day am (14 Sep 2021 06:46)  valsartan-hydrochlorothiazide 160mg-25mg oral tablet: 1 tab(s) orally once a day noon (14 Sep 2021 06:46)  Vitamin B12 1000 mcg oral tablet: 1 tab(s) orally once a day (14 Sep 2021 06:46)    PAST MEDICAL & SURGICAL HISTORY:  HTN (hypertension)    HLD (hyperlipidemia)    DM (diabetes mellitus)    Hypothyroidism    Solitary pulmonary nodule    Depression    History of appendectomy    S/P cholecystectomy    H/O total knee replacement  b/l knee    H/O arthroscopy  left shoulder              VITAL SIGNS:  Vital Signs Last 24 Hrs  T(C): 35.8 (20 Sep 2021 08:00), Max: 36.9 (19 Sep 2021 15:00)  T(F): 96.5 (20 Sep 2021 08:00), Max: 98.4 (19 Sep 2021 15:00)  HR: 68 (20 Sep 2021 10:00) (62 - 82)  BP: 154/53 (20 Sep 2021 10:00) (118/75 - 168/62)  BP(mean): 78 (20 Sep 2021 10:00) (71 - 98)  RR: 26 (20 Sep 2021 10:00) (18 - 29)  SpO2: 92% (20 Sep 2021 10:00) (90% - 100%)    I/Os:   I&O's Detail    19 Sep 2021 07:01  -  20 Sep 2021 07:00  --------------------------------------------------------  IN:    IV PiggyBack: 300 mL    Oral Fluid: 240 mL  Total IN: 540 mL    OUT:    Voided (mL): 1600 mL  Total OUT: 1600 mL    Total NET: -1060 mL      20 Sep 2021 07:01  -  20 Sep 2021 12:06  --------------------------------------------------------  IN:    IV PiggyBack: 100 mL    Oral Fluid: 240 mL  Total IN: 340 mL    OUT:    Voided (mL): 200 mL  Total OUT: 200 mL    Total NET: 140 mL          CAPILLARY BLOOD GLUCOSE      POCT Blood Glucose.: 124 mg/dL (20 Sep 2021 11:30)  POCT Blood Glucose.: 117 mg/dL (20 Sep 2021 07:43)  POCT Blood Glucose.: 158 mg/dL (19 Sep 2021 16:32)  POCT Blood Glucose.: 120 mg/dL (19 Sep 2021 13:32)      =======================MEDICATIONS===================  MEDICATIONS  (STANDING):  acetaminophen  IVPB .. 1000 milliGRAM(s) IV Intermittent once  ALBUTerol    0.083% 2.5 milliGRAM(s) Nebulizer every 6 hours  atorvastatin 10 milliGRAM(s) Oral at bedtime  dextrose 50% Injectable 25 Gram(s) IV Push once  heparin   Injectable 5000 Unit(s) SubCutaneous every 8 hours  influenza  Vaccine (HIGH DOSE) 0.7 milliLiter(s) IntraMuscular once  insulin lispro (ADMELOG) corrective regimen sliding scale   SubCutaneous three times a day before meals  levothyroxine 150 MICROGram(s) Oral daily  metoprolol tartrate 25 milliGRAM(s) Oral two times a day  pantoprazole    Tablet 40 milliGRAM(s) Oral before breakfast  piperacillin/tazobactam IVPB.. 3.375 Gram(s) IV Intermittent every 8 hours  pregabalin 50 milliGRAM(s) Oral daily  senna 2 Tablet(s) Oral at bedtime    MEDICATIONS  (PRN):  acetaminophen   Tablet .. 650 milliGRAM(s) Oral every 6 hours PRN Temp greater or equal to 38C (100.4F), Mild Pain (1 - 3)  ALBUTerol    90 MICROgram(s) HFA Inhaler 2 Puff(s) Inhalation every 6 hours PRN Shortness of Breath  bisacodyl Suppository 10 milliGRAM(s) Rectal daily PRN Constipation  naloxone Injectable 0.1 milliGRAM(s) IV Push every 3 minutes PRN For ANY of the following changes in patient status:  A. RR LESS THAN 10 breaths per minute, B. Oxygen saturation LESS THAN 90%, C. Sedation score of 6  ondansetron Injectable 4 milliGRAM(s) IV Push every 6 hours PRN Nausea        PHYSICAL EXAM============================  General:                         Awake, alert, not in any distress  Neuro:                            Moving all extremities to commands.   Respiratory:	Air entry fair and  bilateral conducted sounds                                           Effort even and unlabored.  CV:		Regular rate and rhythm. Normal S1/S2                                          Distal pulses present.  Abdomen:	                     Soft, non-distended. Bowel sounds present   Skin:		No rash.  Extremities:	Warm, no cyanosis or edema.  Palpable pulses    ============================LABS=========================                        10.0   7.66  )-----------( 106      ( 20 Sep 2021 04:56 )             31.0     -20    139  |  103  |  8   ----------------------------<  127<H>  4.3   |  30  |  0.57    Ca    8.4      20 Sep 2021 04:56  Phos  2.8       Mg     2.20                 Urinalysis Basic - ( 18 Sep 2021 20:31 )    Color: Yellow / Appearance: Clear / S.031 / pH: x  Gluc: x / Ketone: Trace  / Bili: Negative / Urobili: <2 mg/dL   Blood: x / Protein: Trace / Nitrite: Negative   Leuk Esterase: Negative / RBC: 44 /HPF / WBC 2 /HPF   Sq Epi: x / Non Sq Epi: 1 /HPF / Bacteria: Negative      Culture - Sputum (collected 18 Sep 2021 21:12)  Source: .Sputum Sputum  Gram Stain (18 Sep 2021 22:29):    Few Squamous epithelial cells per low power field    Few polymorphonuclear leukocytes per low power field    Few Gram Positive Cocci in Pairs and Chains per oil power field    Culture - Blood (collected 17 Sep 2021 08:16)  Source: .Blood Blood  Preliminary Report (18 Sep 2021 09:01):    No growth to date.    Culture - Sputum (collected 16 Sep 2021 22:00)  Source: .Sputum Sputum  Gram Stain (17 Sep 2021 17:05):    Rare polymorphonuclear leukocytes per low power field    Rare Squamous epithelial cells per low power field    Rare Gram positive cocci in pairs per oil power field  Final Report (19 Sep 2021 09:32):    Normal Respiratory Amry Anne present      A/P:  81yFemale s/p Robotic RVATS, RUL wedgex2, RLL wedgex1, Pneumonolysis  14-Sep-2021 , experiencing  pain with deep breathing.                             Neuro:                                         Pain control with  Tylenol PRN. Avoid Narcotics                            Cardiovascular:                                          HTN: Continue hemodynamic monitoring. Tolerating  Lopressor 25mg q12hrs.     HLD: On Lipitor                            Respiratory:                                         Pt is on 2L  nasal canula, did not tolerate weaning      Febrile yesterday - Cultured and and on antibiotics.                                          Comfortable, not in any distress.                                         Encourage incentive spirometry. Start 3% saline inhalations.                                                                                                     Continue bronchodilators, pulmonary toilet     Keep patient in isolation / droplet precautions because of exposure to staff with COVID  Pt and family is aware of exposure and advised to take quarantine precautions when discharged to home                             GI                                         On  DASH  diet as tolerated.                                          Continue Zofran / Reglan for nausea - PRN	                                                                 Renal:                                         Continue LR  30cc/hr                                         Monitor I/Os and electrolytes                                                                                        Hem/ Onc:                                                                                  Monitor chest tube output &  signs of bleeding.                                          Follow CBC in AM                           Infectious disease:       Febrile yesterday - Cultured and started on antibiotics for presumed pneumonia.                                          Monitor for fever / leukocytosis.                                          All surgical incision / chest tube  sites look clean                            Endocrine                                             DM-2 / Hyperglycemia: Continue Accu-Checks with coverage.   Hold oral meds    Pt is on SQ Heparin and Venodyne boots for DVT prophylaxis.     Pertinent clinical, laboratory, radiographic, hemodynamic, echocardiographic, respiratory data, microbiologic data and chart were reviewed and analyzed frequently throughout the course of the day and night  Patient seen, examined and plan discussed with CT Surgeon Dr. Mora  / CTICU team during rounds.    OOB to chair and ambulate as tolerated.     Carson COTEP

## 2021-09-20 NOTE — DISCHARGE NOTE NURSING/CASE MANAGEMENT/SOCIAL WORK - NSSCNAMETXT_GEN_ALL_CORE
[facility] [phone] Nurse to visit on the day following discharge. Other appropriate services to be arranged thereafter. Please contact the home care agency at the above phone number if you have not heard from them by approximately 12 noon on the day after your hospital discharge.

## 2021-09-22 PROBLEM — I10 ESSENTIAL (PRIMARY) HYPERTENSION: Chronic | Status: ACTIVE | Noted: 2021-09-10

## 2021-09-22 PROBLEM — E03.9 HYPOTHYROIDISM, UNSPECIFIED: Chronic | Status: ACTIVE | Noted: 2021-09-10

## 2021-09-22 PROBLEM — R91.1 SOLITARY PULMONARY NODULE: Chronic | Status: ACTIVE | Noted: 2021-09-10

## 2021-09-22 PROBLEM — F32.9 MAJOR DEPRESSIVE DISORDER, SINGLE EPISODE, UNSPECIFIED: Chronic | Status: ACTIVE | Noted: 2021-09-10

## 2021-09-22 PROBLEM — E78.5 HYPERLIPIDEMIA, UNSPECIFIED: Chronic | Status: ACTIVE | Noted: 2021-09-10

## 2021-09-22 PROBLEM — E11.9 TYPE 2 DIABETES MELLITUS WITHOUT COMPLICATIONS: Chronic | Status: ACTIVE | Noted: 2021-09-10

## 2021-09-22 LAB
CULTURE RESULTS: SIGNIFICANT CHANGE UP
SPECIMEN SOURCE: SIGNIFICANT CHANGE UP

## 2021-09-23 LAB
CULTURE RESULTS: SIGNIFICANT CHANGE UP
SPECIMEN SOURCE: SIGNIFICANT CHANGE UP

## 2021-09-24 ENCOUNTER — NON-APPOINTMENT (OUTPATIENT)
Age: 81
End: 2021-09-24

## 2021-09-29 ENCOUNTER — APPOINTMENT (OUTPATIENT)
Dept: THORACIC SURGERY | Facility: CLINIC | Age: 81
End: 2021-09-29
Payer: MEDICARE

## 2021-09-29 VITALS
HEIGHT: 58 IN | BODY MASS INDEX: 30.64 KG/M2 | DIASTOLIC BLOOD PRESSURE: 79 MMHG | WEIGHT: 146 LBS | TEMPERATURE: 97.6 F | SYSTOLIC BLOOD PRESSURE: 175 MMHG | OXYGEN SATURATION: 96 % | HEART RATE: 78 BPM

## 2021-09-29 DIAGNOSIS — Z09 ENCOUNTER FOR FOLLOW-UP EXAMINATION AFTER COMPLETED TREATMENT FOR CONDITIONS OTHER THAN MALIGNANT NEOPLASM: ICD-10-CM

## 2021-09-29 PROCEDURE — 99024 POSTOP FOLLOW-UP VISIT: CPT

## 2021-09-29 RX ORDER — OXYCODONE 5 MG/1
5 TABLET ORAL EVERY 6 HOURS
Qty: 8 | Refills: 0 | Status: COMPLETED | COMMUNITY
Start: 2021-09-29

## 2021-09-30 LAB
M TB IFN-G BLD-IMP: NEGATIVE
QUANTIFERON TB PLUS MITOGEN MINUS NIL: 5.61 IU/ML
QUANTIFERON TB PLUS NIL: 0.05 IU/ML
QUANTIFERON TB PLUS TB1 MINUS NIL: 0.04 IU/ML
QUANTIFERON TB PLUS TB2 MINUS NIL: 0.07 IU/ML

## 2021-10-14 ENCOUNTER — APPOINTMENT (OUTPATIENT)
Dept: PULMONOLOGY | Facility: CLINIC | Age: 81
End: 2021-10-14

## 2021-10-18 ENCOUNTER — APPOINTMENT (OUTPATIENT)
Dept: PULMONOLOGY | Facility: CLINIC | Age: 81
End: 2021-10-18
Payer: MEDICARE

## 2021-10-18 VITALS
BODY MASS INDEX: 37.32 KG/M2 | HEIGHT: 57 IN | HEART RATE: 81 BPM | OXYGEN SATURATION: 92 % | SYSTOLIC BLOOD PRESSURE: 173 MMHG | DIASTOLIC BLOOD PRESSURE: 67 MMHG | WEIGHT: 173 LBS

## 2021-10-18 DIAGNOSIS — Z20.822 ENCOUNTER FOR PREPROCEDURAL LABORATORY EXAMINATION: ICD-10-CM

## 2021-10-18 DIAGNOSIS — Z01.812 ENCOUNTER FOR PREPROCEDURAL LABORATORY EXAMINATION: ICD-10-CM

## 2021-10-18 PROCEDURE — 99204 OFFICE O/P NEW MOD 45 MIN: CPT

## 2021-10-18 NOTE — PROCEDURE
[FreeTextEntry1] : CT scan, PET scan and recent cxr reviewed--see chart for reports, images reviewed in PACS

## 2021-10-18 NOTE — HISTORY OF PRESENT ILLNESS
[Never] : never [TextBox_4] : 81F with hx of appendiceal ca, on ct scan chest/abdomen found to have nodules, VATS in september of nodules showed nec and non nec granulomas, to date cultures have been negative.  Patient denies any prior pulm hx, no sob/cough since surgery but does have some chest wall discomfort.

## 2021-10-18 NOTE — ASSESSMENT
[FreeTextEntry1] : cont to follow cultures from path, so far negative to date\par \par suggest fu in 3 mo with repeat ct chest and PFT

## 2021-11-03 ENCOUNTER — APPOINTMENT (OUTPATIENT)
Dept: CARDIOLOGY | Facility: CLINIC | Age: 81
End: 2021-11-03
Payer: MEDICARE

## 2021-11-03 ENCOUNTER — NON-APPOINTMENT (OUTPATIENT)
Age: 81
End: 2021-11-03

## 2021-11-03 VITALS
DIASTOLIC BLOOD PRESSURE: 79 MMHG | HEIGHT: 57 IN | WEIGHT: 170 LBS | SYSTOLIC BLOOD PRESSURE: 178 MMHG | OXYGEN SATURATION: 93 % | BODY MASS INDEX: 36.68 KG/M2 | HEART RATE: 67 BPM

## 2021-11-03 PROCEDURE — 93000 ELECTROCARDIOGRAM COMPLETE: CPT

## 2021-11-03 PROCEDURE — 99214 OFFICE O/P EST MOD 30 MIN: CPT

## 2021-11-03 NOTE — HISTORY OF PRESENT ILLNESS
[FreeTextEntry1] : 81 yr F with HTN, Obesity\par Chronic dyspnea on exertiona, TTE in Feb 2021 was normal. \par Nuclear stress test in Feb 2021 no ischemia, EF 70%\par Patient walks with a walker, she underwent biopsy on the right side, as per the patient was benign, she has been complaining of pain when she takes a deep breath and at the surgical scar site but is getting better.  She has supplemental oxygen that she carries with her when she needs to use.  At the same time she also has CPAP that she wears at night.  Her blood pressure is significantly elevated and as per the family her blood pressure is always this high, she states that she is compliant with her medications

## 2021-11-03 NOTE — DISCUSSION/SUMMARY
[FreeTextEntry1] : Hypertension: Blood pressure is significantly elevated, at this time I had a discussion with the patient and recommend that we increase hydralazine to 75 mg 3 times daily, they have appointment to see primary care physician in 2 weeks and they will have further assessment of the blood pressure on the follow-up visit, in the meantime recommend compliance with CPAP salt restriction.\par \par Dyspnea on exertion: This is chronic, likely related to obesity and overall deconditioning.  At this time I do not think there is indication for us to pursue further testing as in the past her echo and stress test did not show any significant cardiac etiology.\par

## 2021-11-03 NOTE — REVIEW OF SYSTEMS
[Feeling Fatigued] : feeling fatigued [SOB] : shortness of breath [Fever] : no fever [Blurry Vision] : no blurred vision [Earache] : no earache [Sore Throat] : no sore throat [Leg Claudication] : no intermittent leg claudication [Cough] : no cough [Abdominal Pain] : no abdominal pain

## 2021-11-03 NOTE — PHYSICAL EXAM
[Normal Conjunctiva] : normal conjunctiva [Normal Venous Pressure] : normal venous pressure [Normal S1, S2] : normal S1, S2 [Clear Lung Fields] : clear lung fields [Soft] : abdomen soft [de-identified] : Obese [de-identified] : Surgical scar site seems to be healthy

## 2021-12-07 ENCOUNTER — APPOINTMENT (OUTPATIENT)
Dept: THORACIC SURGERY | Facility: CLINIC | Age: 81
End: 2021-12-07

## 2022-01-05 ENCOUNTER — APPOINTMENT (OUTPATIENT)
Dept: THORACIC SURGERY | Facility: CLINIC | Age: 82
End: 2022-01-05
Payer: MEDICARE

## 2022-01-05 ENCOUNTER — APPOINTMENT (OUTPATIENT)
Dept: CARDIOLOGY | Facility: CLINIC | Age: 82
End: 2022-01-05
Payer: MEDICARE

## 2022-01-05 ENCOUNTER — NON-APPOINTMENT (OUTPATIENT)
Age: 82
End: 2022-01-05

## 2022-01-05 VITALS
OXYGEN SATURATION: 96 % | RESPIRATION RATE: 16 BRPM | HEART RATE: 90 BPM | DIASTOLIC BLOOD PRESSURE: 77 MMHG | SYSTOLIC BLOOD PRESSURE: 144 MMHG | BODY MASS INDEX: 37.87 KG/M2 | TEMPERATURE: 98.2 F | WEIGHT: 175 LBS

## 2022-01-05 PROCEDURE — 93000 ELECTROCARDIOGRAM COMPLETE: CPT

## 2022-01-05 PROCEDURE — 99214 OFFICE O/P EST MOD 30 MIN: CPT

## 2022-01-05 RX ORDER — FUROSEMIDE 20 MG/1
20 TABLET ORAL
Qty: 90 | Refills: 3 | Status: ACTIVE | COMMUNITY
Start: 1900-01-01 | End: 1900-01-01

## 2022-01-05 RX ORDER — AMLODIPINE BESYLATE 10 MG/1
10 TABLET ORAL DAILY
Qty: 90 | Refills: 3 | Status: ACTIVE | COMMUNITY
Start: 1900-01-01 | End: 1900-01-01

## 2022-01-05 RX ORDER — METOPROLOL TARTRATE 75 MG/1
TABLET, FILM COATED ORAL
Refills: 0 | Status: DISCONTINUED | COMMUNITY
End: 2022-01-05

## 2022-01-05 RX ORDER — HYDRALAZINE HYDROCHLORIDE 50 MG/1
50 TABLET ORAL 3 TIMES DAILY
Qty: 405 | Refills: 3 | Status: ACTIVE | COMMUNITY
Start: 1900-01-01 | End: 1900-01-01

## 2022-01-05 RX ORDER — ATORVASTATIN CALCIUM 10 MG/1
10 TABLET, FILM COATED ORAL
Qty: 90 | Refills: 3 | Status: ACTIVE | COMMUNITY
Start: 1900-01-01 | End: 1900-01-01

## 2022-01-05 RX ORDER — CARVEDILOL 6.25 MG/1
6.25 TABLET, FILM COATED ORAL
Qty: 180 | Refills: 0 | Status: ACTIVE | COMMUNITY
Start: 1900-01-01 | End: 1900-01-01

## 2022-01-05 NOTE — PHYSICAL EXAM
[Normal Conjunctiva] : normal conjunctiva [Normal Venous Pressure] : normal venous pressure [Normal S1, S2] : normal S1, S2 [Clear Lung Fields] : clear lung fields [Soft] : abdomen soft [de-identified] : Obese [de-identified] : Surgical scar site seems to be healthy

## 2022-01-05 NOTE — HISTORY OF PRESENT ILLNESS
[FreeTextEntry1] : 81 yr F with HTN, Obesity\par Chronic dyspnea on exertiona, TTE in Feb 2021 was normal. \par Nuclear stress test in Feb 2021 no ischemia, EF 70%\par Patient walks with a walker, as per the son she has been mostly sedentary walks within the house.  She puts this down to her limited activity since she had the lung surgery.  But no new symptoms to report.  Next week she is moving to [place] to start living with one of her sons who moved to [place] recently.  Patient and the son stated that they will be establishing care with physicians when they are in the new state sinus rhythm 88 bpm

## 2022-01-05 NOTE — DISCUSSION/SUMMARY
[FreeTextEntry1] : Hypertension: Blood pressure is better than before, I recommend that she stays compliant with medications maintain salt restriction, I have given her refills for her medications and she will establish care with new primary care physicians when she is in [place] next week.\par \par Dyspnea on exertion: This is chronic, l as documented her stress test echocardiogram did not show any major abnormalities.  She has significant obesity and deconditioning that are likely contributors to her dyspnea, I recommend that she should try to ambulate more, weight loss and follow-up with her primary care physicians and need for ischemic testing etc. if there is any change in her symptoms.\par

## 2022-01-18 ENCOUNTER — APPOINTMENT (OUTPATIENT)
Dept: PULMONOLOGY | Facility: CLINIC | Age: 82
End: 2022-01-18

## 2022-01-24 ENCOUNTER — APPOINTMENT (OUTPATIENT)
Dept: THORACIC SURGERY | Facility: CLINIC | Age: 82
End: 2022-01-24
Payer: MEDICARE

## 2022-01-24 DIAGNOSIS — J84.10 PULMONARY FIBROSIS, UNSPECIFIED: ICD-10-CM

## 2022-01-24 PROCEDURE — 99214 OFFICE O/P EST MOD 30 MIN: CPT

## 2022-01-24 RX ORDER — OXYCODONE 5 MG/1
5 TABLET ORAL EVERY 6 HOURS
Qty: 8 | Refills: 0 | Status: COMPLETED | COMMUNITY
Start: 2021-09-29 | End: 2022-01-24

## 2022-01-24 RX ORDER — RANITIDINE 150 MG/1
150 TABLET ORAL
Refills: 0 | Status: COMPLETED | COMMUNITY
End: 2022-01-24

## 2025-03-19 NOTE — H&P PST ADULT - RESPIRATORY RATE (BREATHS/MIN)
Vanessa Jolly (: 1950) is a 74 y.o. female, established patient, here for evaluation of the following chief complaint(s):  Follow-up (Was told by PT to be seen for lymphedema. Still sore/tender in right lower quadrant.  )       ASSESSMENT/PLAN:  1. Localized swelling of right lower extremity  -     Vascular duplex lower extremity venous right; Future  2. Chronic RLQ pain  -     CT ABDOMEN PELVIS W IV CONTRAST Additional Contrast? Radiologist Recommendation; Future  3. Lymphedema of both lower extremities  -     BS - Occupational Therapy, [facility]    STAT US placed to R/O clot as she just had orthopedic procedure with new onset erythema and slightly worsened edema to that extremity.     She does have a hx of lymphedema, would like to also proceed with OT eval for this.     Placing CT abdomen/pelvis given several months of chronic RLQ pain that has been consistently daily.   SUBJECTIVE/OBJECTIVE:  HPI    Chronic F/U:     Last seen in office on 25 for AWV.     She has concerns for right lower extremity edema today, mainly around her right ankle. She had an orthopedic procedure in February on her right knee and endorses new onset erythema to her right ankle as well. She had concerns for a possible cellulitis and had messaged the Orthopedic office via [employer] regarding these concerns.     She also continues to endorse a chronic RLQ pain for at least several months. She denies any new onset vomiting or changes in BM. She did have constipation after her procedure however that has seemed to resolve. When pointing, she points to her right mid abdomen/RLQ and describes as soreness. Will occasionally feel a sharp pain to that area. Hx of ALISON. She had a colonoscopy last year which was unremarkable. On exam, when pushing over mid right abdomen, felt \"knot like\" area and patient endorsed tenderness. No palpable mass felt.     Vitals:    25 0916   BP: (!) 142/86   Pulse: 70   Temp:  14